# Patient Record
Sex: MALE | Race: WHITE
[De-identification: names, ages, dates, MRNs, and addresses within clinical notes are randomized per-mention and may not be internally consistent; named-entity substitution may affect disease eponyms.]

---

## 2018-12-24 ENCOUNTER — HOSPITAL ENCOUNTER (OUTPATIENT)
Dept: HOSPITAL 53 - M SMT | Age: 68
End: 2018-12-24
Attending: UROLOGY
Payer: MEDICARE

## 2018-12-24 DIAGNOSIS — Z87.898: ICD-10-CM

## 2018-12-24 DIAGNOSIS — Z79.899: ICD-10-CM

## 2018-12-24 DIAGNOSIS — N40.0: Primary | ICD-10-CM

## 2018-12-24 PROCEDURE — 84153 ASSAY OF PSA TOTAL: CPT

## 2018-12-24 PROCEDURE — 36415 COLL VENOUS BLD VENIPUNCTURE: CPT

## 2019-09-27 ENCOUNTER — HOSPITAL ENCOUNTER (OUTPATIENT)
Dept: HOSPITAL 53 - M SMT | Age: 69
End: 2019-09-27
Attending: OPHTHALMOLOGY
Payer: MEDICARE

## 2019-09-27 DIAGNOSIS — H16.223: Primary | ICD-10-CM

## 2019-09-27 LAB
FT4I SERPL CALC-MCNC: 3.6 % (ref 1.4–3.8)
T3RU NFR SERPL: 34 % (ref 33–40)
T4 FREE SERPL-MCNC: 1.08 NG/DL (ref 0.76–1.46)
T4 SERPL-MCNC: 10.7 UG/DL (ref 4.5–12)
TSH SERPL DL<=0.005 MIU/L-ACNC: 1.72 UIU/ML (ref 0.36–3.74)

## 2019-09-30 LAB — T3 SERPL-MCNC: 148.4 NG/DL (ref 60–181)

## 2020-01-22 ENCOUNTER — HOSPITAL ENCOUNTER (OUTPATIENT)
Dept: HOSPITAL 53 - M PLALAB | Age: 70
End: 2020-01-22
Attending: UROLOGY
Payer: MEDICARE

## 2020-01-22 DIAGNOSIS — Z87.898: Primary | ICD-10-CM

## 2020-01-22 PROCEDURE — 36415 COLL VENOUS BLD VENIPUNCTURE: CPT

## 2020-08-26 ENCOUNTER — HOSPITAL ENCOUNTER (OUTPATIENT)
Dept: HOSPITAL 53 - M RAD | Age: 70
End: 2020-08-26
Attending: INTERNAL MEDICINE
Payer: MEDICARE

## 2020-08-26 DIAGNOSIS — R91.1: ICD-10-CM

## 2020-08-26 DIAGNOSIS — Z12.2: Primary | ICD-10-CM

## 2020-08-26 DIAGNOSIS — F17.211: ICD-10-CM

## 2020-12-22 ENCOUNTER — HOSPITAL ENCOUNTER (OUTPATIENT)
Dept: HOSPITAL 53 - M RAD | Age: 70
End: 2020-12-22
Attending: INTERNAL MEDICINE
Payer: MEDICARE

## 2020-12-22 DIAGNOSIS — Z87.891: ICD-10-CM

## 2020-12-22 DIAGNOSIS — R91.1: ICD-10-CM

## 2020-12-22 DIAGNOSIS — Z12.2: Primary | ICD-10-CM

## 2020-12-30 ENCOUNTER — HOSPITAL ENCOUNTER (OUTPATIENT)
Dept: HOSPITAL 53 - M LAB REF | Age: 70
End: 2020-12-30
Attending: INTERNAL MEDICINE
Payer: MEDICARE

## 2020-12-30 DIAGNOSIS — E79.0: Primary | ICD-10-CM

## 2021-01-26 ENCOUNTER — HOSPITAL ENCOUNTER (OUTPATIENT)
Dept: HOSPITAL 53 - M PLALAB | Age: 71
End: 2021-01-26
Attending: UROLOGY
Payer: MEDICARE

## 2021-01-26 DIAGNOSIS — N40.1: Primary | ICD-10-CM

## 2021-01-26 DIAGNOSIS — Z87.898: ICD-10-CM

## 2021-01-26 PROCEDURE — 84153 ASSAY OF PSA TOTAL: CPT

## 2021-01-26 PROCEDURE — 36415 COLL VENOUS BLD VENIPUNCTURE: CPT

## 2021-06-25 ENCOUNTER — HOSPITAL ENCOUNTER (OUTPATIENT)
Dept: HOSPITAL 53 - M RAD | Age: 71
End: 2021-06-25
Attending: INTERNAL MEDICINE
Payer: MEDICARE

## 2021-06-25 DIAGNOSIS — R91.1: ICD-10-CM

## 2021-06-25 DIAGNOSIS — Z12.2: Primary | ICD-10-CM

## 2021-06-25 DIAGNOSIS — Z87.891: ICD-10-CM

## 2021-06-25 DIAGNOSIS — J84.10: ICD-10-CM

## 2021-08-30 ENCOUNTER — HOSPITAL ENCOUNTER (OUTPATIENT)
Dept: HOSPITAL 53 - M LABSMTC | Age: 71
End: 2021-08-30
Attending: ANESTHESIOLOGY
Payer: MEDICARE

## 2021-08-30 DIAGNOSIS — Z01.812: Primary | ICD-10-CM

## 2021-08-30 DIAGNOSIS — Z20.822: ICD-10-CM

## 2021-09-03 ENCOUNTER — HOSPITAL ENCOUNTER (OUTPATIENT)
Dept: HOSPITAL 53 - M OPP | Age: 71
Discharge: HOME | End: 2021-09-03
Attending: SURGERY
Payer: MEDICARE

## 2021-09-03 VITALS — SYSTOLIC BLOOD PRESSURE: 177 MMHG | DIASTOLIC BLOOD PRESSURE: 74 MMHG

## 2021-09-03 VITALS — WEIGHT: 163 LBS | HEIGHT: 66 IN | BODY MASS INDEX: 26.2 KG/M2

## 2021-09-03 DIAGNOSIS — R13.10: ICD-10-CM

## 2021-09-03 DIAGNOSIS — K37: ICD-10-CM

## 2021-09-03 DIAGNOSIS — K63.5: ICD-10-CM

## 2021-09-03 DIAGNOSIS — Z79.899: ICD-10-CM

## 2021-09-03 DIAGNOSIS — K31.89: ICD-10-CM

## 2021-09-03 DIAGNOSIS — Z87.891: ICD-10-CM

## 2021-09-03 DIAGNOSIS — Z12.11: Primary | ICD-10-CM

## 2021-09-03 DIAGNOSIS — K21.00: ICD-10-CM

## 2021-09-03 DIAGNOSIS — Z86.010: ICD-10-CM

## 2021-09-03 DIAGNOSIS — K57.30: ICD-10-CM

## 2021-09-03 DIAGNOSIS — K22.2: ICD-10-CM

## 2021-09-03 PROCEDURE — 88305 TISSUE EXAM BY PATHOLOGIST: CPT

## 2021-09-03 PROCEDURE — 43239 EGD BIOPSY SINGLE/MULTIPLE: CPT

## 2021-09-03 PROCEDURE — 45385 COLONOSCOPY W/LESION REMOVAL: CPT

## 2021-09-03 PROCEDURE — 43251 EGD REMOVE LESION SNARE: CPT

## 2021-10-16 ENCOUNTER — HOSPITAL ENCOUNTER (EMERGENCY)
Dept: HOSPITAL 53 - M ED | Age: 71
Discharge: HOME | End: 2021-10-16
Payer: MEDICARE

## 2021-10-16 VITALS — WEIGHT: 170.35 LBS | HEIGHT: 66.5 IN | BODY MASS INDEX: 27.05 KG/M2

## 2021-10-16 VITALS — DIASTOLIC BLOOD PRESSURE: 77 MMHG | SYSTOLIC BLOOD PRESSURE: 159 MMHG

## 2021-10-16 DIAGNOSIS — I10: Primary | ICD-10-CM

## 2021-10-16 DIAGNOSIS — M10.9: ICD-10-CM

## 2021-10-16 DIAGNOSIS — E78.5: ICD-10-CM

## 2021-10-16 LAB
ALBUMIN SERPL BCG-MCNC: 4 GM/DL (ref 3.2–5.2)
ALT SERPL W P-5'-P-CCNC: 56 U/L (ref 12–78)
BASOPHILS # BLD AUTO: 0 10^3/UL (ref 0–0.2)
BASOPHILS NFR BLD AUTO: 0.5 % (ref 0–1)
BILIRUB CONJ SERPL-MCNC: 0.1 MG/DL (ref 0–0.2)
BILIRUB SERPL-MCNC: 0.6 MG/DL (ref 0.2–1)
BUN SERPL-MCNC: 22 MG/DL (ref 7–18)
CALCIUM SERPL-MCNC: 8.9 MG/DL (ref 8.8–10.2)
CHLORIDE SERPL-SCNC: 107 MEQ/L (ref 98–107)
CK MB CFR.DF SERPL CALC: 1.43
CK MB SERPL-MCNC: 1.2 NG/ML (ref ?–3.6)
CK SERPL-CCNC: 84 U/L (ref 39–308)
CO2 SERPL-SCNC: 26 MEQ/L (ref 21–32)
CREAT SERPL-MCNC: 0.86 MG/DL (ref 0.7–1.3)
EOSINOPHIL # BLD AUTO: 0.3 10^3/UL (ref 0–0.5)
EOSINOPHIL NFR BLD AUTO: 2.9 % (ref 0–3)
GFR SERPL CREATININE-BSD FRML MDRD: > 60 ML/MIN/{1.73_M2} (ref 42–?)
GLUCOSE SERPL-MCNC: 95 MG/DL (ref 70–100)
HCT VFR BLD AUTO: 47.6 % (ref 42–52)
HGB BLD-MCNC: 15.6 G/DL (ref 13.5–17.5)
LYMPHOCYTES # BLD AUTO: 2 10^3/UL (ref 1.5–5)
LYMPHOCYTES NFR BLD AUTO: 22.3 % (ref 24–44)
MCH RBC QN AUTO: 31.4 PG (ref 27–33)
MCHC RBC AUTO-ENTMCNC: 32.8 G/DL (ref 32–36.5)
MCV RBC AUTO: 95.8 FL (ref 80–96)
MONOCYTES # BLD AUTO: 1 10^3/UL (ref 0–0.8)
MONOCYTES NFR BLD AUTO: 11.1 % (ref 2–8)
NEUTROPHILS # BLD AUTO: 5.5 10^3/UL (ref 1.5–8.5)
NEUTROPHILS NFR BLD AUTO: 62.6 % (ref 36–66)
PLATELET # BLD AUTO: 219 10^3/UL (ref 150–450)
POTASSIUM SERPL-SCNC: 5 MEQ/L (ref 3.5–5.1)
PROT SERPL-MCNC: 7.4 GM/DL (ref 6.4–8.2)
RBC # BLD AUTO: 4.97 10^6/UL (ref 4.3–6.1)
SODIUM SERPL-SCNC: 138 MEQ/L (ref 136–145)
TROPONIN I SERPL-MCNC: < 0.02 NG/ML (ref ?–0.1)
TSH SERPL DL<=0.005 MIU/L-ACNC: 1.47 UIU/ML (ref 0.36–3.74)
WBC # BLD AUTO: 8.7 10^3/UL (ref 4–10)

## 2021-10-16 NOTE — CCD
Continuity of Care Document (CCD)

                             Created on: 2021



Adal Casillas

External Reference #: [id number]

: 1950

Sex: Male



Demographics





                          Address                   [address] [place]  [address]

 

                          Home Phone                [phone]

 

                          Preferred Language        Unknown

 

                          Marital Status            Unknown

 

                          Church Affiliation     Unknown

 

                          Race                      White

 

                          Ethnic Group              Not  or 





Author





                          Author                    Adal PRICE PA

 

                          Organization              Unknown

 

                          Address                   [address]

[place]  [address]



 

                          Phone                     [phone]







Care Team Providers





                    Care Team Member Name Role                Phone

 

                    Indio Gomez MD @ [facility]                [phone]







Problems





                                        Description

 

                                        No Information Available







Social History





                Type            Date            Description     Comments

 

                Birth Sex                       Unknown          

 

                ETOH Use                        Currently consumes alcohol CASE/

WK 

 

                Tobacco Use     Start: Unknown End: Unknown Patient is a former 

smoker 1/2 ppd x55 

years quit in 2018 

 

                Recreational Drug Use                 Regularly uses Marijuana  







Allergies, Adverse Reactions, Alerts





                                        Description

 

                                        No Known Drug Allergies







Medications





           Active Medications SIG        Qnty       Indications Ordering Provide

r Date

 

                          Omeprazole                     40mg Capsules DR       

            1 by mouth 

daily           30caps          K21.00          Cade Pride M.D. 2021

 

                          Allopurinol                     100mg Tablets         

          2 by mouth every

day                                             Unknown         

 

           Finasteride                     5mg Tablets                   daily  

                          Unknown    



 

                          Aspirin Low Dose                     81mg Tablets DR  

                 1 PO 

Daily                                           Unknown         

 

                          Vitamin B-12                     1000mcg Tablets      

             1 by mouth 

every day                                       Unknown         

 

                          Atorvastatin Calcium                     20mg Tablets 

                  one tab 

once daily                                      Unknown         

 

                                        History Medications

 

                                        Suprep Bowel Prep Kit                   

  17.5-3.13-1.6GM/177ML Solution        

                take per doctor's bowel prep instructions. 354ml                

           Cade Pride M.D.                                    2021 - 2021







Immunizations





                                        Description

 

                                        No Information Available







Vital Signs





                Date            Vital           Result          Comment

 

                2021  1:30pm BP Systolic     148 mmHg         

 

                    BP Diastolic        76 mmHg              

 

                    Heart Rate          83 /min              

 

                    Body Temperature    98.3 F             

 

                    Height              66.5 inches         5'6.50"

 

                    Weight              172.00 lb            

 

                    BMI (Body Mass Index) 27.3 kg/m2           

 

                    Ideal Body Weight   142 lb               

 

                    Weight              78.019 kg            

 

                    BSA (Body Surface Area) 1.89 m2              

 

                2021 10:55am BP Systolic     135 mmHg         

 

                    BP Diastolic        74 mmHg              

 

                    Body Temperature    98.4 F             

 

                    Height              66.5 inches         5'6.50"

 

                    Weight              171.38 lb            

 

                    BMI (Body Mass Index) 27.2 kg/m2           

 

                    Ideal Body Weight   142 lb               

 

                    Weight              77.736 kg            

 

                    BSA (Body Surface Area) 1.88 m2              







Results





        Test    Acquired Date Facility Test    Result  H/L     Range   Note

 

                    Laboratory test finding 2021          [facility]

                                        [address] [place], [place] [id number] [phone] Pathology Request For Service (SEE NOTE)              

          1







                          1                         FINAL DIAGNOSIS



A-Gastric polyp, polypectomy:

Polypoid fragments of gastric mucosa with focal foveolar hyperplasia,

mild chronic inflammation and reactive changes.

No H.pylori is identified.



B-Colon polyps, polypectomy:

Tubular adenoma, fragments.

Separate fragments of hyperplastic polyp.

                                        2021 - 



CLINICAL DIAGNOSIS



H/O polyps, dysphagia

                                        2021 - 1450



GROSS DIAGNOSIS



A - Received in formalin labeled "gastric polyp" and consists of

fragment of tissue 0.3 x 0.2 x 0.1 cm.  All in one.



B - Received in formalin labeled "colon polyps" and consists of

fragments of tissue 0.5 x 0.5 x 0.2 cm. in aggregate.  All in one.

                                        -OA

                                        2021 - 



Signed________ RIKKI AMBRIZ MD 2021 1053









Procedures





                Date            Code            Description     Status

 

                2021      54151           Office/Outpatient Established Mo

d MDM 30-39 Min Completed

 

                2021      77998           Colonoscopy W/ Poly Completed

 

                2021      64458           Endoscopy Upper GI Remove Tumor/

Polyp/Lesion Snare Technique 

Completed

 

                2021      34690           Endoscopy Upper GI Balloon Dilat

ion Of Esophagus Completed

 

                2021      96086           Office/Outpatient New Moderate M

DM 45-59 Minutes Completed







[employer] Devices





                                        Description

 

                                        No Information Available







Encounters





           Type       Date       Location   Provider   Dx         Diagnosis

 

             Office Visit 2021  1:30p [facility] KENIA Cabello 

D12.6                                   Benign neoplasm of colon, unspecified

 

                          K21.00                    Gastro-esophageal reflux dis

 with esophagitis, without bleed

 

                          K57.30                    Dvrtclos of lg int w/o perfo

ration or abscess w/o bleeding

 

             Office Visit 2021 10:30a [facility] KENIA Cabello 

Z86.010                                 Personal history of colonic polyps

 

                          R13.10                    Dysphagia, unspecified

 

                          K57.30                    Dvrtclos of lg int w/o perfo

ration or abscess w/o bleeding







Assessments





                Date            Code            Description     Provider

 

                2021      D12.6           Benign neoplasm of colon, unspec

ified KENIA Lancaster

 

                    2021          K21.00              Gastro-esophageal re

flux disease with esophagitis, without 

bleeding                                KENIA Lancaster

 

                    2021          K57.30              Diverticulosis of la

rge intestine without perforation or 

abscess without bleeding                KENIA Lancaster

 

                2021      Z12.11          Encounter for screening for vikash

gnant neoplasm of colon Cade Pride M.D.

 

                2021      Z86.010         Personal history of colonic poly

ps Cade Pride M.D.

 

                2021      D12.6           Benign neoplasm of colon, unspec

ified Cade Pride M.D.

 

                2021      K31.7           Polyp of stomach and duodenum Ro

ceci Pride M.D.

 

                    2021          K57.30              Diverticulosis of la

rge intestine without perforation or 

abscess without bleeding                Cade Pride M.D.

 

                2021      R13.10          Dysphagia, unspecified Cade barney M.D.

 

                2021      K22.2           Esophageal obstruction Cade barney M.D.

 

                2021      K31.89          Other diseases of stomach and du

odenum Cade Pride M.D.

 

                    2021          K21.00              Gastro-esophageal re

flux disease with esophagitis, without 

bleeding                                Cade Pride M.D.

 

                2021      Z86.010         Personal history of colonic poly

ps KENIA Lancaster

 

                2021      R13.10          Dysphagia, unspecified KENIA aCbello

 

                    2021          K57.30              Diverticulosis of la

rge intestine without perforation or 

abscess without bleeding                KENIA Lancaster







Plan of Treatment

2021 - KENIA Lancaster* D12.6 Benign neoplasm of colon, unspecified

* K21.00 Gastro-esophageal reflux disease with esophagitis, without bleeding* 
  New Medication:* Omeprazole 40 mg - 1 by mouth daily





* K57.30 Diverticulosis of large intestine without perforation or abscess 
  without bleeding





Functional Status





                                        Description

 

                                        No Information Available







Mental Status





                                        Description

 

                                        No Information Available







Referrals





                                        Description

 

                                        No Information Available

## 2021-10-16 NOTE — CCD
Continuity of Care Document (CCD)

                             Created on: 2021



Adal Casillas

External Reference #: [id number]

: 1950

Sex: Male



Demographics





                          Address                   [address] [place]  [address]

 

                          Home Phone                [phone]

 

                          Preferred Language        Unknown

 

                          Marital Status            Unknown

 

                          [employer]ian Affiliation     Unknown

 

                          Race                      White

 

                          Ethnic Group              Not  or 





Author





                          Author                    Adal PRICE PA

 

                          Organization              Unknown

 

                          Address                   [address]

[place]  [address]



 

                          Phone                     [phone]







Care Team Providers





                    Care Team Member Name Role                Phone

 

                    Indio Gomez MD @ [facility]                [phone]







Problems





                                        Description

 

                                        No Information Available







Social History





                Type            Date            Description     Comments

 

                Birth Sex                       Unknown          

 

                ETOH Use                        Currently consumes alcohol CASE/

WK 

 

                Tobacco Use     Start: Unknown End: Unknown Patient is a former 

smoker 1/2 ppd x55 

years quit in 2018 

 

                Recreational Drug Use                 Regularly uses Marijuana  







Allergies, Adverse Reactions, Alerts





                                        Description

 

                                        No Known Drug Allergies







Medications





           Active Medications SIG        Qnty       Indications Ordering Provide

r Date

 

                          Omeprazole                     40mg Capsules DR       

            1 by mouth 

daily           30caps          K21.00          Cade Pride M.D. 2021

 

                          Allopurinol                     100mg Tablets         

          2 by mouth every

day                                             Unknown         

 

           Finasteride                     5mg Tablets                   daily  

                          Unknown    



 

                          Aspirin Low Dose                     81mg Tablets DR  

                 1 PO 

Daily                                           Unknown         

 

                          Vitamin B-12                     1000mcg Tablets      

             1 by mouth 

every day                                       Unknown         

 

                          Atorvastatin Calcium                     20mg Tablets 

                  one tab 

once daily                                      Unknown         

 

                                        History Medications

 

                                        Suprep Bowel Prep Kit                   

  17.5-3.13-1.6GM/177ML Solution        

                take per doctor's bowel prep instructions. 354ml                

           Cade Pride M.D.                                    2021 - 2021







Immunizations





                                        Description

 

                                        No Information Available







Vital Signs





                Date            Vital           Result          Comment

 

                2021  1:30pm BP Systolic     148 mmHg         

 

                    BP Diastolic        76 mmHg              

 

                    Heart Rate          83 /min              

 

                    Body Temperature    98.3 F             

 

                    Height              66.5 inches         5'6.50"

 

                    Weight              172.00 lb            

 

                    BMI (Body Mass Index) 27.3 kg/m2           

 

                    Ideal Body Weight   142 lb               

 

                    Weight              78.019 kg            

 

                    BSA (Body Surface Area) 1.89 m2              

 

                2021 10:55am BP Systolic     135 mmHg         

 

                    BP Diastolic        74 mmHg              

 

                    Body Temperature    98.4 F             

 

                    Height              66.5 inches         5'6.50"

 

                    Weight              171.38 lb            

 

                    BMI (Body Mass Index) 27.2 kg/m2           

 

                    Ideal Body Weight   142 lb               

 

                    Weight              77.736 kg            

 

                    BSA (Body Surface Area) 1.88 m2              







Results





        Test    Acquired Date Facility Test    Result  H/L     Range   Note

 

                    Laboratory test finding 2021          [facility] Lab

                                        [address] [place] [address]

           [phone] Pathology Request For Service (SEE NOTE)              

          1







                          1                         FINAL DIAGNOSIS



A-Gastric polyp, polypectomy:

Polypoid fragments of gastric mucosa with focal foveolar hyperplasia,

mild chronic inflammation and reactive changes.

No H.pylori is identified.



B-Colon polyps, polypectomy:

Tubular adenoma, fragments.

Separate fragments of hyperplastic polyp.

                                        2021 - 



CLINICAL DIAGNOSIS



H/O polyps, dysphagia

                                        2021 - 1450



GROSS DIAGNOSIS



A - Received in formalin labeled "gastric polyp" and consists of

fragment of tissue 0.3 x 0.2 x 0.1 cm.  All in one.



B - Received in formalin labeled "colon polyps" and consists of

fragments of tissue 0.5 x 0.5 x 0.2 cm. in aggregate.  All in one.

                                        -OA

                                        2021 - 



Signed________ RIKKI AMBRIZ MD 2021 1053









Procedures





                Date            Code            Description     Status

 

                2021      63974           Colonoscopy W/ Poly Completed

 

                2021      55908           Endoscopy Upper GI Remove Tumor/

Polyp/Lesion Snare Technique 

Completed

 

                2021      53053           Endoscopy Upper GI Balloon Dilat

ion Of Esophagus Completed

 

                2021      12189           Office/Outpatient New Moderate M

DM 45-59 Minutes Completed







[employer]





                                        Description

 

                                        No Information Available







Encounters





           Type       Date       Location   Provider   Dx         Diagnosis

 

             Office Visit 2021 10:30a [facility] KENIA Cabello 

Z86.010                                 Personal history of colonic polyps

 

                          R13.10                    Dysphagia, unspecified

 

                          K57.30                    Dvrtclos of lg int w/o perfo

ration or abscess w/o bleeding







Assessments





                Date            Code            Description     Provider

 

                2021      D12.6           Benign neoplasm of colon, unspec

ified KENIA Lancaster

 

                    2021          K21.00              Gastro-esophageal re

flux disease with esophagitis, without 

bleeding                                KENIA Lancaster

 

                    2021          K57.30              Diverticulosis of la

rge intestine without perforation or 

abscess without bleeding                KENIA Lancaster

 

                2021      Z12.11          Encounter for screening for vikash

gnant neoplasm of colon Cade Pride M.D.

 

                2021      Z86.010         Personal history of colonic poly

ps Cade Pride M.D.

 

                2021      D12.6           Benign neoplasm of colon, unspec

ified Cade Pride M.D.

 

                2021      K31.7           Polyp of stomach and duodenum Ro

ceci Pride M.D.

 

                    2021          K57.30              Diverticulosis of la

rge intestine without perforation or 

abscess without bleeding                Cade Pride M.D.

 

                2021      R13.10          Dysphagia, unspecified Cade barney M.D.

 

                2021      K22.2           Esophageal obstruction Cade barney M.D.

 

                2021      K31.89          Other diseases of stomach and du

odenum Cade Pride M.D.

 

                    2021          K21.00              Gastro-esophageal re

flux disease with esophagitis, without 

bleeding                                Cade Pride M.D.

 

                2021      Z86.010         Personal history of colonic poly

ps KENIA Lancaster

 

                2021      R13.10          Dysphagia, unspecified KENIA Cabello

 

                    2021          K57.30              Diverticulosis of la

rge intestine without perforation or 

abscess without bleeding                KENIA Lancaster







Plan of Treatment

2021 - KENIA Lancaster* D12.6 Benign neoplasm of colon, unspecified

* K21.00 Gastro-esophageal reflux disease with esophagitis, without bleeding* 
  New Medication:* Omeprazole 40 mg - 1 by mouth daily





* K57.30 Diverticulosis of large intestine without perforation or abscess 
  without bleeding





Functional Status





                                        Description

 

                                        No Information Available







Mental Status





                                        Description

 

                                        No Information Available







Referrals





                                        Description

 

                                        No Information Available

## 2021-10-16 NOTE — CCD
Continuity of Care Document (CCD)

                             Created on: 2021



Adal Casillas

External Reference #: [id number]

: 1950

Sex: Male



Demographics





                          Address                   [address] [place]  [address]

 

                          Home Phone                [phone]

 

                          Preferred Language        Unknown

 

                          Marital Status            Unknown

 

                          Mormon Affiliation     Unknown

 

                          Race                      White

 

                          Ethnic Group              Declined to Specify/Unknown





Author





                          Author                    Adal MOSES [place]

 DO

 

                          Organization              Unknown

 

                          Address                   [address] [facility] [address]

[place]  [address]



 

                          Phone                     [phone]







Care Team Providers





                    Care Team Member Name Role                Phone

 

                    Indio Moses JR, MD AUTM                Unavailable

 

                    Adonay Bermudez M.D. AUTM                [phone]







Problems





                    Active Problems     Provider            Date

 

                    Benign prostatic hyperplasia without outflow obstruction RASTA King 

Onset: 07/10/2012

 

                    Chronic obstructive lung disease RASTA Faith Onse

t: 07/10/2012

 

                    Gout                RASTA Faith Onset: 07/10/2012

 

                    Alcohol abuse       RASTA Faith Onset: 07/10/2012

 

                    Tobacco user        RASTA Faith Onset: 07/10/2012

 

                    Old myocardial infarction Edson Aguiar, FNP   Onset: 

016

 

                    Raised prostate specific antigen Indio Moses MD Onset

: 2019







Social History





                Type            Date            Description     Comments

 

                Birth Sex                       Unknown          

 

                ETOH Use                        Currently consumes alcohol He dr

inks about a case of beer a week 

 

                Tobacco Use     Start: Unknown End: Unknown Patient is a former 

smoker He quit 

smoking in the summer of . He smoked a 1/2 a pack plus of cigarettes for 
about 60 years 







Allergies, Adverse Reactions, Alerts





                                        Description

 

                                        No Known Drug Allergies







Medications





           Active Medications SIG        Qnty       Indications Ordering Provide

r Date

 

                          Atorvastatin Calcium                     20mg Tablets 

                  Take One

Tablet By Mouth Every Day 90tabs                          Indio Moses MD 

10/07/2016

 

                          Allopurinol                     100mg Tablets         

          Take Two Tablets

By Mouth Every Day 180tabs                         Indio Moses MD 06/15/2

010

 

                          Finasteride                     5mg Tablets           

        1 by mouth every 

day             30tabs                          Indio Moses MD 

 

                          Aspirin 81                     81mg Tablets DR Awan by mouth 

every day                                       Unknown         







Medications Administered in Office





           Medication SIG        Qnty       Indications Ordering Provider Date

 

                          Covid-19 vaccine, Unspecified                      Inj

ection                    

                                                Unknown         2021

 

                          Covid-19 vaccine, Unspecified                      Inj

ection                    

                                                Unknown         2021

 

                                        Immunization Adminstration,1 Vaccine/Tox

oid                      Injection      

                                                    Indio Moses MD 2020







Immunizations





             CPT Code     Status       Date         Vaccine      Lot #

 

             U-Flu        Given        2020   Influenza,Unspecified  

 

             38261        Given        2020   Adacel- Tetanus Diphtheria P

ertussis (Age65 & Under) 

[id number]

 

                69001           Given           10/30/2018      Shingrix Zoster 

Vaccine (HZV), Recombinant, Subunit, 

Adjuvanted                               

 

             U-Flu        Given        10/18/2018   Influenza,Unspecified  

 

             40315        Given        2018   Shingrix      

 

             U-Pneum      Given        09/15/2017   Pneumococcal,Unspecified  

 

             U-PneuC      Given        09/15/2016   Prevnar 13    

 

             18091        Given        2013   Zostavax      

 

             65631        Given        06/15/2010   Pneumovax 23  







Vital Signs





                Date            Vital           Result          Comment

 

                2021  9:25am BP Systolic     136 mmHg         

 

                    BP Diastolic        80 mmHg              

 

                    Height              66.50 inches        5'6.50"

 

                    Weight              169.12 lb            

 

                    BMI (Body Mass Index) 26.9 kg/m2           

 

                2020  9:05am BP Systolic     136 mmHg         

 

                    BP Diastolic        72 mmHg              

 

                    Heart Rate          78 /min              

 

                    Height              66.50 inches        5'6.50"

 

                    Weight              168.00 lb            

 

                    O2 % BldC Oximetry  98 %                 

 

                    BMI (Body Mass Index) 26.7 kg/m2           







Results





        Test    Acquired Date Facility Test    Result  H/L     Range   Note

 

                    Coronavirus 2019 Nasopharygeal 2021          [facility]

                                        [address] [place], [place] [id number] [phone] Coronavirus 2019 Nasopharygeal ASSAY INFORMATIO <SEE N

OTE>                        1

 

                    Complete Blood Count 2021          [facility]

s, pc

: Dr Indio Moses

[place] [address]

           [phone] WBC        6.8 x10*3/UL            4.1 - 10.9  

 

             RBC          5.02 x10*6/UL              4.20 - 6.30   

 

             Hemoglobin   15.5 g/dL                 12.0 - 18.0   

 

             Hematocrit   46.5 %                    37.0 - 51.0   

 

             MCV          92.6 fL                   80.0 - 97.0   

 

             MCH          30.9 pg                   26.0 - 32.0   

 

             MCHC         33.4 g/dL                 31.0 - 38.0   

 

             RDW          13.7 %                    11.6 - 13.7   

 

             PLT          238 x10*3/UL              140 - 440     

 

             MPV          7.7 FL       Low          7.8 - 11.0    

 

             Lymph %      30.1 %                    10.0 - 58.5   

 

             Mid %        6.9 %                     1.7 - 9.3     

 

             Neut %       63.0 %                    37.0 - 92.0   

 

             Lymph #      2.0 x10*3/UL              0.6 - 4.1     

 

             Mid #        0.5 x10*3/UL              0.1 - 0.6     

 

             Neut #       4.3 x10*3/UL              2.0 - 7.8     

 

                    Comprehensive Chem Profile 2021          [facility]

[facility], 

: Dr Indio Moses

[place] [address] [phone] Glucose    103 mg/dL  High       74 - 99    2

 

             BUN          21 mg/dL     High         7 - 18        

 

             Creatinine   0.8 mg/dL                 0.6 - 1.3     

 

             Sodium       138 mEq/L                 136 - 145     

 

             Potassium    4.5 mEq/L                 3.5 - 5.1     

 

             Chloride     104 mEq/L                 98 - 107      

 

             Carbon Dioxide 22 mEq/L                  21 - 32       

 

             Calcium      9.5 mg/dL                 8.5 - 10.1    

 

             Alk. Phosphatase 70 mg/dL                  46 - 116      

 

             Total Bilirubin 0.6 mg/dL                 0.2 - 1.0     

 

             Ast (Sgot)   25 U/L                    15 - 37       

 

             Alt (SGPT)   58 U/L                    12 - 78       

 

             Albumin      4.2 g/dL                  3.4 - 5.0     

 

             Total Protein 7.1 g/dL                  6.4 - 8.2     

 

             A/G Ratio    1.45 CALC                 1.00 - 1.90   

 

             GFR  >= 60 mL/min              >60           

 

             GFR African American >= 60 mL/min              >60          3

 

                    Lipid Profile       2021          [facility]

, 

: Dr Indio Moses

[place] [address] [phone] Cholesterol 160 mg/dL             131 - 200   

 

             Triglycerides 158 mg/dL    High         30 - 150      

 

             HDL Cholesterol 57 mg/dL                  35 - 60       

 

             LDL (Calculated) 71 CALC                   50 - 159      

 

                    Laboratory test finding 2021          [facility]

: Dr Indio Moses

[place] [address] [phone] PSA        1.55 ng/mL            <4.00      4







                          1                         ASSAY INFORMATION: Real Time

 RT-PCR

NOTE: The COVID-19 assay has been cleared by the U.S. Food and Drug

Administration under the Emergency Use Authorization (EUA). [employer] [facility] and [facility] are designated as high complexity laboratories by the

Clinical Laboratory Improvement Amendments of 1988(CLIA) and are qualified to

perform this test.



Not Detected





 

                          2                         100-125 mg/dL     PRE-DIABET

ES/FASTING

>126 mg/dL          DIABETES/FASTING



 

                          3                         CHRONIC KIDNEY DISEASE STAGI

NG PER NKF



STAGE I & II      GFR >= 60        NORMAL TO MILDLY DECREASED

STAGE III          GFR 30-59          MODERATELY DECREASED

STAGE IV           GFR 15-29         SEVERELY DECREASED

STAGE V            GFR <15            VERY LITTLE GFR LEFT

ESRD                 GFR <15            ON RRT



 

                          4                         This assay was performed on 

the [employer] EXL using the B-

Galactosidase/CPRG methodology and should not be compared interchangeably with 
other methods.  The PSA should not be used alone as a screening test for the 
presence or absence of malignant disease.









Procedures





                Date            Code            Description     Status

 

                    2021          97986               Chronic Care MGMT 20

 Mins Clinical Staff Time Per Calendar 

Month                                   Completed

 

                2021      32048           Office/Outpatient Established Mo

d MDM 30-39 Min Completed

 

                    2021          38245               Chronic Care MGMT 20

 Mins Clinical Staff Time Per Calendar 

Month                                   Completed

 

                2021      99617           Chronic Care Management Services

 Ea Addl 20 Min Completed

 

                    2021          02739               Chronic Care MGMT 20

 Mins Clinical Staff Time Per Calendar 

Month                                   Completed

 

                2021      28000           Chronic Care Management Services

 Ea Addl 20 Min Completed

 

                10/31/2019      [id number]       Diabetic Retinal Eye Exam Comple

justa

 

                10/25/2016      [id number]        Colonoscopy     Completed

 

                2016      [id number]        Colonoscopy     Completed

 

                2011      [id number]        Colonoscopy     Completed

 

                2008      31604445        Colonoscopy     Completed

 

                10/17/2006      [id number]        Colonoscopy     Completed







[employer]





                                        Description

 

                                        No Information Available







Encounters





           Type       Date       Location   Provider   Dx         Diagnosis

 

             Office Visit 2021  9:40a [facility] Indio Moses MD 

N40.0                                   Benign prostatic hyperplasia without low

er urinry tract symp

 

                          R97.20                    Elevated prostate specific a

ntigen [PSA]

 

                          E79.0                     Hyperuricemia w/o signs of i

nflam arthrit and tophaceous dis

 

                          R91.1                     Solitary pulmonary nodule

 

                          F17.211                   Nicotine dependence, cigaret

kimber, in remission

 

                          R73.09                    Other abnormal glucose

 

                          Z86.010                   Personal history of colonic 

polyps

 

                          E78.00                    Pure hypercholesterolemia, u

nspecified

 

                          Z13.89                    Encounter for screening for 

other disorder







Assessments





                Date            Code            Description     Provider

 

                    2021          N40.0               Benign prostatic hyp

erplasia without lower urinary tract 

symptoms                                Indio Moses MD

 

                2021      E78.00          Pure hypercholesterolemia, unspe

cified Indio Moses MD

 

                2021      Z86.010         Personal history of colonic poly

ps Indio Moses MD

 

                    2021          N40.0               Benign prostatic hyp

erplasia without lower urinary tract 

symptoms                                Indio Moses MD

 

                2021      R97.20          Elevated prostate specific antig

en [PSA] Indio Moses MD

 

                    2021          E79.0               Hyperuricemia withou

t signs of inflammatory arthritis and 

tophaceous disease                      Indio Moses MD

 

                2021      R91.1           Solitary pulmonary nodule Selvin

jah Moses MD

 

                2021      F17.211         Nicotine dependence, cigarettes,

 in remission Indio Moses MD

 

                2021      R73.09          Other abnormal glucose Indio Moses MD

 

                2021      Z86.010         Personal history of colonic poly

ps Indio Moses MD

 

                2021      E78.00          Pure hypercholesterolemia, unspe

cified Indio Moses MD

 

                2021      Z13.89          Encounter for screening for othe

r disorder Indio Moses MD

 

                    2021          N40.0               Benign prostatic hyp

erplasia without lower urinary tract 

symptoms                                Indio Moses MD

 

                2021      E78.00          Pure hypercholesterolemia, unspe

cified Indio Moses MD

 

                2021      J44.9           Chronic obstructive pulmonary di

sease, unspecified Indio Moses MD

 

                    2021          N40.0               Benign prostatic hyp

erplasia without lower urinary tract 

symptoms                                Indio Moses MD

 

                2021      E78.00          Pure hypercholesterolemia, unspe

cified Indio Moses MD

 

                2021      F17.211         Nicotine dependence, cigarettes,

 in remission Indio Moses MD

 

                2021      J44.9           Chronic obstructive pulmonary di

sease, unspecified Indio Moses MD







Plan of Treatment

Future Appointment(s):* 2022  9:00 am - [facility] - Indio Moses MD* E79.0 Hyperuricemia without signs of 
  inflammatory arthritis and tophaceous disease

* N40.0 Benign prostatic hyperplasia without lower urinary tract sym

* F17.211 Nicotine dependence, cigarettes, in remission* Comments:* Smoking 
  cessation discussed.





* E78.00 Pure hypercholesterolemia, unspecified

* R97.20 Elevated prostate specific antigen [PSA]

* Z86.010 Personal history of colonic polyps

* Z13.89 Encounter for screening for other disorder

* Z23 Encounter for immunization





Functional Status





                                        Description

 

                                        No Information Available







Mental Status





                                        Description

 

                                        No Information Available







Referrals





                Refer to      Reason for Referral Status          Appt Date

 

                Cade Pride MD CONSULT FOR SCREENING COLONOSCOPY  Patient 

Notified 

2021

 

                                        [facility]

 

                                        [address] [facility] [address]

 

                                        [place] [address]

 

                                        [phone]

 

                                          

 

                [facility] Chest CT Screening Program CT EARLY CANCER LUNG SCREENING EX

AM  Created         



 

                                        [address] [place]  [address]

 

                                        [phone]

## 2021-10-16 NOTE — CCD
Summarization Of Episode

                             Created on: 10/16/2021



LINDA PEREIRA

External Reference #: [id number]

: 1950

Sex: Undifferentiated



Demographics





                          Address                   [address] [place] [address]

 

                          Home Phone                [phone]

 

                          Preferred Language        English

 

                          Marital Status            Unknown

 

                          Restorationism Affiliation     Unknown

 

                          Race                      Unknown

 

                          Ethnic Group              Not  or 





Author





                          Author                    [facility]

 

                          Organization              [facility]

 

                          Address                   Unknown

 

                          Phone                     Unavailable







Support





                Name            Relationship    Address         Phone

 

                RE              Next Of Kin     Unknown         Unavailable

 

                    LAWYER JASMINA    Next Of Kin         [address] [place] [address]                    [phone]

 

                    JASMINA PEREIRA    Next Of Kin         [address] [place] [address]                    [phone]

 

                    JASMINA PEREIRA    ECON                [address] [place] [address]                    Unavailable







Care Team Providers





                    Care Team Member Name Role                Phone

 

                    HAILY Gomez MD Unavailable         Unavailable

 

                    HAILY Gomez MD Unavailable         Unavailable

 

                    HAILY Gomez MD Unavailable         Unavailable

 

                    HAILY Gomez MD Unavailable         Unavailable

 

                    HAILY Gomez MD Unavailable         Unavailable

 

                    HAILY Gomez MD Unavailable         Unavailable

 

                    HAILY Gomez MD Unavailable         Unavailable

 

                    HAILY Gomez MD Unavailable         Unavailable

 

                    HAILY Gomez MD Unavailable         Unavailable

 

                    HAILY Gomez MD Unavailable         Unavailable

 

                    HAILY Gomez MD Unavailable         Unavailable

 

                    HAILY Gomez MD Unavailable         Unavailable

 

                    HAILY Gomez MD Unavailable         Unavailable

 

                    HAILY Gomez MD Unavailable         Unavailable

 

                    HAILY Gomez MD Unavailable         Unavailable

 

                    HAILY Gomez MD Unavailable         Unavailable

 

                    HAILY Gomez MD Unavailable         Unavailable

 

                    HAILY Gomez MD Unavailable         Unavailable

 

                    HAILY Gomez MD Unavailable         Unavailable

 

                    HAILY Gomez MD Unavailable         Unavailable

 

                    HAILY Gomez MD Unavailable         Unavailable

 

                    HAILY Gomez MD Unavailable         Unavailable

 

                    HAILY Gomez MD Unavailable         Unavailable

 

                    HAILY Gomez MD Unavailable         Unavailable

 

                    HAILY Gomez MD Unavailable         Unavailable

 

                    HAILY Gomez MD Unavailable         Unavailable

 

                    HAILY Gomez MD Unavailable         Unavailable

 

                    HAILY Gomez MD Unavailable         Unavailable

 

                    HAILY Gomez MD Unavailable         Unavailable

 

                    HAILY Gomez MD Unavailable         Unavailable

 

                    HAILY Gomez MD Unavailable         Unavailable

 

                    HAILY Gomez MD Unavailable         Unavailable

 

                    HAILY Gomez MD Unavailable         Unavailable

 

                    HAILY Gomez MD Unavailable         Unavailable

 

                    HAILY Gomez MD Unavailable         Unavailable

 

                    JasonHAILY MD Unavailable         Unavailable

 

                    ColfaxHAILY MD Unavailable         Unavailable

 

                    JasonHAILY MD Unavailable         Unavailable

 

                    ColfaxHAILY MD Unavailable         Unavailable

 

                    ColfaxHAILY MD Unavailable         Unavailable

 

                    ColfaxHAILY MD Unavailable         Unavailable

 

                    ColfaxHAILY MD Unavailable         Unavailable

 

                    JasonHAILY MD Unavailable         Unavailable

 

                    JasonHAILY MD Unavailable         Unavailable

 

                    JasonHAILY MD Unavailable         Unavailable

 

                    ColfaxHAILY MD Unavailable         Unavailable

 

                    JasonHAILY MD Unavailable         Unavailable

 

                    ColfaxHAILY MD Unavailable         Unavailable

 

                    ColfaxHAILY MD Unavailable         Unavailable

 

                    ColfaxHAILY MD Unavailable         Unavailable

 

                    ColfaxHAILY MD Unavailable         Unavailable

 

                    ColfaxHAILY MD Unavailable         Unavailable

 

                    ColfaxHAILY MD Unavailable         Unavailable

 

                    ColfaxHAILY MD Unavailable         Unavailable

 

                    JasonHAILY MD Unavailable         Unavailable

 

                    ColfaxHAILY MD Unavailable         Unavailable

 

                    JsaonHAILY MD Unavailable         Unavailable

 

                    JasonHAILY MD Unavailable         Unavailable

 

                    JasonHAILY MD Unavailable         Unavailable

 

                    ColfaxHAILY MD Unavailable         Unavailable

 

                    JasonHAILY MD Unavailable         Unavailable

 

                    JasonHAILY MD Unavailable         Unavailable

 

                    ColfaxHAILY MD Unavailable         Unavailable

 

                    ColfaxHAILY MD Unavailable         Unavailable

 

                    ColfaxHAILY MD Unavailable         Unavailable

 

                    ColfaxHAILY MD Unavailable         Unavailable

 

                    ColfaxHAILY MD Unavailable         Unavailable

 

                    JasonHAILY MD Unavailable         Unavailable

 

                    ColfaxHAILY MD Unavailable         Unavailable

 

                    JasonHAILY paredes MD Unavailable         Unavailable

 

                    JasonHAILY paredes MD Unavailable         Unavailable

 

                    JasonHAILY MD Unavailable         Unavailable

 

                    ColfaxHAILY MD Unavailable         Unavailable

 

                    ColfaxHAILY paredes MD Unavailable         Unavailable

 

                    ColfaxHAILY MD Unavailable         Unavailable

 

                    JasonHAILY paredes MD Unavailable         Unavailable

 

                    JasonHAILY paredes MD Unavailable         Unavailable

 

                    JasonHAILY paredes MD Unavailable         Unavailable

 

                    JasonHAILY MD Unavailable         Unavailable

 

                    JasonHAILY MD Unavailable         Unavailable

 

                    JasonHAILY MD Unavailable         Unavailable

 

                    JasonHAILY MD Unavailable         Unavailable

 

                    JasonHAILY MD Unavailable         Unavailable

 

                    JasonHAILY MD Unavailable         Unavailable

 

                    JasonHAILY MD Unavailable         Unavailable

 

                    ColfaxHAILY MD Unavailable         Unavailable

 

                    Amaya, L Dunia RPA Unavailable         Unavailable

 

                    Amaya, L Dunia RPA Unavailable         Unavailable

 

                    Amaya, L Dunia RPA Unavailable         Unavailable

 

                    Amaya, L Dunia RPA Unavailable         Unavailable

 

                    Amaya, L Dunia RPA Unavailable         Unavailable

 

                    Amaya, L Dunia RPA Unavailable         Unavailable

 

                    Amaya, L Dunia RPA Unavailable         Unavailable

 

                    Amaya, L Dunia RPA Unavailable         Unavailable

 

                    Amaya, L Dunia RPA Unavailable         Unavailable

 

                    Amaya, L Dunia RPA Unavailable         Unavailable

 

                    Amaya, L Dunia RPA Unavailable         Unavailable

 

                    Amaya, L Dunia RPA Unavailable         Unavailable

 

                    Amaya, L Dunia RPA Unavailable         Unavailable

 

                    Amaya, L Dunia RPA Unavailable         Unavailable

 

                    Amaya, L Dunia RPA Unavailable         Unavailable

 

                    Amaya, L Dunia RPA Unavailable         Unavailable

 

                    Amaya, L Dunia RPA Unavailable         Unavailable

 

                    Amaya, L Dunia RPA Unavailable         Unavailable

 

                    Amaya, L Dunia RPA Unavailable         Unavailable

 

                    Amaya, L Dunia RPA Unavailable         Unavailable

 

                    Amyaa, L Dunia RPA Unavailable         Unavailable

 

                    Amaya, L Dunia RPA Unavailable         Unavailable

 

                    Amaya, L Dunia RPA Unavailable         Unavailable

 

                    Amaya, L Dunia RPA Unavailable         Unavailable

 

                    Amaya, L Dunia RPA Unavailable         Unavailable

 

                    Amaya, L Dunia RPA Unavailable         Unavailable

 

                    Amaya, L Dunia RPA Unavailable         Unavailable

 

                    Amaya, L Dunia RPA Unavailable         Unavailable

 

                    Amaya, L Dunia RPA Unavailable         Unavailable

 

                    Amaya, L Dunia RPA Unavailable         Unavailable

 

                    Amaya, L Dunia RPA Unavailable         Unavailable

 

                    Amaya, L Dunia RPA Unavailable         Unavailable



                                  



Re-disclosure Warning

          The records that you are about to access may contain information from 
federally-assisted alcohol or drug abuse programs. If such information is 
present, then the following federally mandated warning applies: This information
has been disclosed to you from records protected by federal confidentiality 
rules (42 CFR part 2). The federal rules prohibit you from making any further 
disclosure of this information unless further disclosure is expressly permitted 
by the written consent of the person to whom it pertains or as otherwise 
permitted by 42 CFR part 2. A general authorization for the release of medical 
or other information is NOT sufficient for this purpose. The Federal rules 
restrict any use of the information to criminally investigate or prosecute any 
alcohol or drug abuse patient.The records that you are about to access may 
contain highly sensitive health information, the redisclosure of which is 
protected by Article 27-F of the [facility] Public Health law. If you 
continue you may have access to information: Regarding HIV / AIDS; Provided by 
facilities licensed or operated by the [facility] Office of Mental Health; 
or Provided by the [facility] Office for People With Developmental 
Disabilities. If such information is present, then the following [place] 
mandated warning applies: This information has been disclosed to you from 
confidential records which are protected by state law. State law prohibits you 
from making any further disclosure of this information without the specific 
written consent of the person to whom it pertains, or as otherwise permitted by 
law. Any unauthorized further disclosure in violation of state law may result in
a fine or penitentiary sentence or both. A general authorization for the release of 
medical or other information is NOT sufficient authorization for further disc
losure.                                                                         
    



Encounters

          



           Encounter  Providers  Location   Date       Indications Data Source(s

)

 

                Outpatient      Attender: Dunia Amaya RPA Dano/Copeland/Darci/R

eindl 2021 

01:30:00 PM EDT                                     [facility] ([facility]

[facility]

 

                Outpatient      Attender: Dunia Matson/Copeland/Darci/R

eindl 2021 

10:30:00 AM EDT                                     [facility] ([facility]

actice, )

 

                Outpatient      Attender: Indio Napoles 0

2021 09:40:00 AM

EDT                                                 [facility] ([facility]

)

 

                Outpatient                      [address] [place]

[place] [address] 2021 12:00:00 AM

EST                                                 eCW1 ([facility])

 

                Unknown                         [address] [place]

[place] [address] 2021 12:00:00 AM 

EST                                                 eCW1 ([facility])

 

                Outpatient      Attender: Indio Napoles 1

 08:00:00 AM

EST                                                 [facility] ([facility]

)



                                                                                
                                                         



Immunizations

          



             Vaccine      Date         Status       Description  Data Source(s)

 

             COVID-19 VACCINE Moderna 2021 12:00:00 AM EDT completed      

           [facility]

 

                                        Vaccine Series Complete: YESThis Data wa

s Submitted to [facility]. 

 

                    COVID-19 VACCINE, MRNA-1273, LNP-S (MODERNA)/PF 2021 1

2:00:00 AM EDT 

completed                                           [facility]

 

                    COVID-19 VACCINE, MRNA-1273, LNP-S (MODERNA)/PF 2021 1

2:00:00 AM EST 

completed                                           [facility]

 

                                        This CVX code allows reporting of a vacc

ination when formulation is unknown (for

example, when recording a Influenza vaccination when noted on a vaccination 
card)           2020 08:28:00 AM EDT completed                       [facility] ([facility])

 

                          INFLUENZA VACCINE QUADRIVALENT  (65 YR UP)/MF59

C.1/PF 2020 12:00:00

AM EDT              completed                               [facility]



                                                                                
                                               



Medications

          



          Medication Brand Name Start Date Product Form Dose      Route     Admi

nistrative 

Instructions Pharmacy Instructions Status     Indications Reaction   Description

 Data 

Source(s)

 

          40 mg               09/15/2021 12:00:00 AM EDT capsule,delayed release

(DR/EC) 30                  TAKE ONE 

CAPSULE BY MOUTH EVERY DAY TAKE ONE CAPSULE BY MOUTH EVERY DAY SOLD: 2021 

   

                                                            [facility]

 

                    Omeprazole 40 MG Delayed Release Oral Capsule Omeprazole    

      2021 12:00:00 AM 

EDT                  ORAL                 active                      [facility] ([facility], )

 

        240 mcg/0.7 mL         2021 12:00:00 AM EDT syringe 0             

  INJECT AS DIRECTED 

INJECT AS DIRECTED SOLD: 2021                                        [facility]

[facility]

 

                    SUPREP BOWEL PREP KIT 17.5-3.13-1.6 gram SODIUM, POTASSIUM,M

AG SULFATES 

2021 12:00:00 AM EDT recon soln      354                             TAKE 

PER DOCTOR'S BOWEL PREP 

INSTRUCTIONS TAKE PER DOCTOR'S BOWEL PREP INSTRUCTIONS SOLD: 2021         

                         

[facility]

 

        Suprep Bowel Prep Kit Suprep Bowel Prep Kit 2021 12:00:00 AM EDT  

                                

             completed                                           [facility] ([facility], )

 

          100 mg              2021 12:00:00 AM EDT tablet    180          

       TAKE TWO TABLETS BY MOUTH EVERY

DAY        TAKE TWO TABLETS BY MOUTH EVERY DAY SOLD: 2021                 

                 [facility]

 

          100 mg              2021 12:00:00 AM EDT tablet    180          

       TAKE TWO TABLETS BY MOUTH EVERY

DAY        TAKE TWO TABLETS BY MOUTH EVERY DAY SOLD: 2021                 

                 [facility]

 

       Covid-19 vaccine, Unspecified        2021 12:00:00 AM EDT          

                          completed  

                                                            [facility] ([facility])

 

                                        Medication administered onsite 

 

                atorvastatin 20 MG Oral Tablet ATORVASTATIN CALCIUM 2021 1

2:00:00 AM EDT 

tablet          90                              TAKE ONE TABLET BY MOUTH EVERY D

AY TAKE ONE TABLET BY MOUTH EVERY 

DAY          SOLD: 2021                                        [facility]

s

 

                atorvastatin 20 MG Oral Tablet ATORVASTATIN CALCIUM 2021 1

2:00:00 AM EDT 

tablet          90                              TAKE ONE TABLET BY MOUTH EVERY D

AY TAKE ONE TABLET BY MOUTH EVERY 

DAY          SOLD: 2021                                        [facility]

s

 

                atorvastatin 20 MG Oral Tablet ATORVASTATIN CALCIUM 2021 1

2:00:00 AM EDT 

tablet          90                              TAKE ONE TABLET BY MOUTH EVERY D

AY TAKE ONE TABLET BY MOUTH EVERY 

DAY          SOLD: 2021                                        [facility]

[facility]

 

       Covid-19 vaccine, Unspecified        2021 12:00:00 AM EST          

                          completed  

                                                            [facility]

 

                                        Medication administered onsite 

 

          Finasteride 5 MG Oral Tablet FINASTERIDE 2021 12:00:00 AM EST ta

blet    90                  

TAKE ONE TABLET BY MOUTH EVERY DAY TAKE ONE TABLET BY MOUTH EVERY DAY SOLD: 

2021                                                      [facility]

 

          Finasteride 5 MG Oral Tablet FINASTERIDE 2021 12:00:00 AM EST ta

blet    90                  

TAKE ONE TABLET BY MOUTH EVERY DAY TAKE ONE TABLET BY MOUTH EVERY DAY SOLD: 

2021                                                      [facility]

 

          Finasteride 5 MG Oral Tablet FINASTERIDE 2021 12:00:00 AM EST ta

blet    90                  

TAKE ONE TABLET BY MOUTH EVERY DAY TAKE ONE TABLET BY MOUTH EVERY DAY SOLD: 

2021                                                      [facility]

 

          500-125 mg           2020 12:00:00 AM EST tablet    30          

        TAKE ONE TABLET BY MOUTH 

THREE TIMES A DAY FOR 10 DAYS           TAKE ONE TABLET BY MOUTH THREE TIMES A D

AY FOR 10 

DAYS         SOLD: 2020                                        [facility]

s

 

          5-325 mg            2020 12:00:00 AM EST tablet    32           

       TAKE 1-2 TABLETS BY MOUTH 

EVERY 4 HOURS AS NEEDED FOR PAIN MAXIMUM DAILY DOSE = 8 TAKE 1-2 TABLETS BY 

MOUTH EVERY 4 HOURS AS NEEDED FOR PAIN MAXIMUM DAILY DOSE = 8 SOLD: 2020  

         

                                                            [facility]

 

          800 mg              2020 12:00:00 AM EST tablet    21           

       TAKE ONE TABLET BY MOUTH EVERY 8

HOURS AS NEEDED WITH FOOD UP TO 7 DAYS  TAKE ONE TABLET BY MOUTH EVERY 8 HOURS A

S

NEEDED WITH FOOD UP TO 7 DAYS SOLD: 2020                                  

      [facility]

 

          Finasteride 5 MG Oral Tablet FINASTERIDE 2020 12:00:00 AM EDT ta

blet    30                  

TAKE ONE TABLET BY MOUTH EVERY DAY TAKE ONE TABLET BY MOUTH EVERY DAY SOLD: 

2020                                                      [facility]

 

          Finasteride 5 MG Oral Tablet FINASTERIDE 2020 12:00:00 AM EDT ta

blet    30                  

TAKE ONE TABLET BY MOUTH EVERY DAY TAKE ONE TABLET BY MOUTH EVERY DAY SOLD: 

2020                                                      [facility]

 

          Finasteride 5 MG Oral Tablet FINASTERIDE 2020 12:00:00 AM EDT ta

blet    30                  

TAKE ONE TABLET BY MOUTH EVERY DAY TAKE ONE TABLET BY MOUTH EVERY DAY SOLD: 

10/28/2020                                                      [facility]

 

          Finasteride 5 MG Oral Tablet FINASTERIDE 2020 12:00:00 AM EDT ta

blet    30                  

TAKE ONE TABLET BY MOUTH EVERY DAY TAKE ONE TABLET BY MOUTH EVERY DAY SOLD: 

2020                                                      [facility]

 

                atorvastatin 20 MG Oral Tablet ATORVASTATIN CALCIUM 2020 1

2:00:00 AM EDT 

tablet          90                              TAKE ONE TABLET BY MOUTH EVERY D

AY TAKE ONE TABLET BY MOUTH EVERY 

DAY          SOLD: 2020                                        [facility]

s

 

                atorvastatin 20 MG Oral Tablet ATORVASTATIN CALCIUM 2020 1

2:00:00 AM EDT 

tablet          90                              TAKE ONE TABLET BY MOUTH EVERY D

AY TAKE ONE TABLET BY MOUTH EVERY 

DAY          SOLD: 2020                                        [facility]

s

 

          100 mg              2020 12:00:00 AM EDT tablet    180          

       TAKE TWO TABLETS BY MOUTH EVERY

DAY        TAKE TWO TABLETS BY MOUTH EVERY DAY SOLD: 2021                 

                 [facility]

 

          100 mg              2020 12:00:00 AM EDT tablet    180          

       TAKE TWO TABLETS BY MOUTH EVERY

DAY        TAKE TWO TABLETS BY MOUTH EVERY DAY SOLD: 10/25/2020                 

                 [facility]

 

          Finasteride 5 MG Oral Tablet FINASTERIDE 2020 12:00:00 AM EDT ta

blet    30                  

TAKE ONE TABLET BY MOUTH EVERY DAY TAKE ONE TABLET BY MOUTH EVERY DAY SOLD: 

2020                                                      [facility]



                                                                                
                                                                                
                                                                                
                                                                                
    



Insurance Providers

          



             Payer name   Policy type / Coverage type Policy ID    Covered party

 ID Covered 

party's relationship to gomez Policy Gomez             Plan Information

 

          Medicare [employer] Medicare Primary           [id number]      Self   

              

 

          MEDICARE            [id number]           [place]                  [id number]

A

 

                Medicare [employer] Medicare Primary [id number]      

[id number] Self                                    0

[id number]

 

                Medicare [employer] Medicare Primary [id number]      

[id number] Self                                    0

[id number]

 

                Medicare Natl Govt [facility] Medicare Primary [id number]      

[id number] Self                                    0

[id number]

 

                              [id number]                               [id number]

A

 

          Medicaid  Medigap Part B [id number]  [id number]

4.0 Self                

[id number]

 

                    [employer]are Part B [id number]   

                            

[id number]

 

                    [employer]-Medicaid [id number]          

                     

[id number]

 

                    ANSI-Medicare Part B [id number]   

                            

[id number]

 

                    ANSI-Medicare Part B [id number]   

                            

[id number]

 

                    ANSI-Medicare Part B [id number]   

                            

[id number]

 

          [facility] MEDICAID           [id number]            SP                  [id number]

E

 

          Medicaid  Medigap Part B [id number]  [id number]

4.0 Self                

[id number]

 

          Medicaid  Medigap Part B [id number]  [id number]

4.0 Self                

[id number]

 

          MEDICARE            [id number]           SP                  [id number]

A

 

          Medicaid  Medigap Part B 1 1       [id number]     Self                1 1

 

          Medicaid [place] Medigap Part B           [id number]

7068.0 Self                 

 

             Medicare [facility] Medicare Primary              [id number]

[id number] 

Self                                                 

 

                              [id number]                                [id number]

 

                    ANSI-Medicaid [id number]          

                     

[id number]

 

          MEDICARE            [id number]           SP                  [id number]

J89

 

          EMEDNY              [id number]            SP                  [id number]

 

          MEDICAID  M         [id number]  [id number] S                   [id number]

 

          MEDICARE  C         [id number]                   [id number]

[id number]

 

          MEDICAID            [id number]            SP                  [id number]



                                                                                
                                                                                
                                                                                
                                                                                
              



Problems, Conditions, and Diagnoses

          



           Code       Display Name Description Problem Type Effective Dates Data

 Source(s)

 

                    N40.1               Benign prostatic hypertrophy with outflo

w obstruction BPH loc w urin 

obs/LUTS            Problem             2021 12:00:00 AM EST [id number] ([facility])



                                                                                
                 



Surgeries/Procedures

          



             Procedure    Description  Date         Indications  Data Source(s)

 

             OFFICE OUTPATIENT VISIT 25 MINUTES              2021 12:00:00

 AM EDT              [facility] 

([facility], )

 

             Endoscopy Upper GI Balloon Dilation Of Esophagus               12:00:00 AM EDT              

[facility] ([facility], )

 

                    Endoscopy Upper GI Remove Tumor/Polyp/Lesion Snare Technique

                     2021 

12:00:00 AM EDT                                     [facility] ([facility]

actice, )

 

             Colonoscopy W/ Poly              2021 12:00:00 AM EDT        

      [facility] ([facility], )

 

             OFFICE OUTPATIENT NEW 45 MINUTES              2021 12:00:00 A

M EDT              [facility] 

([facility], )

 

                    Chronic Care MGMT 20 Mins Clinical Staff Time Per Calendar M

[facility]                     2021 

12:00:00 AM EDT                                     [facility] ([facility]

)

 

             OFFICE OUTPATIENT VISIT 25 MINUTES              2021 12:00:00

 AM EDT              [facility] 

([facility])

 

             Chronic Care Management Services Ea Addl 20 Min              2021 12:00:00 AM EDT              

[facility] ([facility])

 

                    Chronic Care MGMT 20 Mins Clinical Staff Time Per Calendar M

[facility]                     2021 

12:00:00 AM EDT                                     [facility] ([facility]

)

 

             Chronic Care Management Services Ea Addl 20 Min              2021 12:00:00 AM EDT              

[facility] ([facility])

 

                    Chronic Care MGMT 20 Mins Clinical Staff Time Per Calendar M

[facility]                     2021 

12:00:00 AM EDT                                     [facility] ([facility]

)

 

             Chronic Care Management Services Ea Addl 20 Min              2021 12:00:00 AM EST              

[facility]

 

                    Chronic Care MGMT 20 Mins Clinical Staff Time Per Calendar M

[facility]                     2021 

12:00:00 AM EST                                     [facility] ([facility]

)



                                                                                
                                                                                
                                              



Results

          



                    ID                  Date                Data Source

 

                    [id number]         2021 12:46:00 PM EDT [facility] ([facility], )









          Name      Value     Range     Interpretation Code Description Data Uma

rce(s) Supporting 

Document(s)

 

           Surgical pathology study Laboratory test result                      

            [facility] ([facility], )                            

 

                                        FINAL DIAGNOSIS



A-Gastric polyp, polypectomy:

Polypoid fragments of gastric mucosa with focal foveolar hyperplasia,

mild chronic inflammation and reactive changes.

No H.pylori is identified.



B-Colon polyps, polypectomy:

Tubular adenoma, fragments.

Separate fragments of hyperplastic polyp.

                                        2021 - 



CLINICAL DIAGNOSIS



H/O polyps, dysphagia

                                        2021 - 1450



GROSS DIAGNOSIS



A - Received in formalin labeled "gastric polyp" and consists of

fragment of tissue 0.3 x 0.2 x 0.1 cm.  All in one.



B - Received in formalin labeled "colon polyps" and consists of

fragments of tissue 0.5 x 0.5 x 0.2 cm. in aggregate.  All in one.

                                        -DARRIN

                                        2021 - 



Signed________ RIKKI AMBRIZ MD 2021 [id number]

 









                    ID                  Date                Data Source

 

                    [id number]           2021 09:45:00 AM EDT [facility]









          Name      Value     Range     Interpretation Code Description Data Uma

rce(s) Supporting 

Document(s)

 

                                        SARS-CoV-2 (COVID-19) RNA [Presence] in 

Respiratory specimen by MARY with probe 

detection  Not Detected                                  [facility]      

 

                                        This lab was ordered by [facility] and reported by [employer] 









                    ID                  Date                Data Source

 

                    [id number]          2021 09:45:00 AM EDT [facility]









          Name      Value     Range     Interpretation Code Description Data Uma

rce(s) Supporting 

Document(s)

 

           Coronavirus 2019 Nasopharygeal Laboratory test result                

                  [facility] ([facility])                              

 

                                        ASSAY INFORMATION: Real Time RT-PCR

NOTE: The COVID-19 assay has been cleared by the U.S. Food and Drug

Administration under the Emergency Use Authorization (EUA). [facility] and [facility] are designated as high complexity laboratories by the

Clinical Laboratory Improvement Amendments of 1988(CLIA) and are qualified to

perform this test.



Not Detected



 









                    ID                  Date                Data Source

 

                    [id number]          2021 09:41:00 AM EDT [facility]









          Name      Value     Range     Interpretation Code Description Data Uma

rce(s) Supporting 

Document(s)

 

           Prostate specific Ag [Mass/volume] in Serum or Plasma 1.55 ng/mL     

                             [facility] 

([facility]                   

 

                                        This assay was performed on the Siemens 

Dimension EXL using the B-

Galactosidase/CPRG methodology and should not be compared interchangeably with 
other methods.  The PSA should not be used alone as a screening test for the 
presence or absence of malignant disease.

 









                    ID                  Date                Data Source

 

                    [id number]          2021 09:41:00 AM EDT [facility]









          Name      Value     Range     Interpretation Code Description Data Uma

rce(s) Supporting 

Document(s)

 

           Cholesterol [Mass/volume] in Serum or Plasma 160 mg/dL  131-200      

                    [facility] 

([facility])                   

 

           Triglyceride [Mass/volume] in Serum or Plasma 158 mg/dL        

                     [facility] 

([facility])                   

 

                    Cholesterol in LDL [Mass/volume] in Serum or Plasma by calcu

lation 71 CALC             

                                          [facility] ([facility])  

 

           Cholesterol in HDL [Mass/volume] in Serum or Plasma 57 mg/dL   35-60 

                           [facility] 

([facility])                   









                    ID                  Date                Data Source

 

                    [id number]          2021 09:41:00 AM EDT [facility] ([facility])









          Name      Value     Range     Interpretation Code Description Data Uma

rce(s) Supporting 

Document(s)

 

           Glucose [Mass/volume] in Serum or Plasma 103 mg/dL  74-99            

                [facility] ([facility])                              

 

                                        100-125 mg/dL     PRE-DIABETES/FASTING

>126 mg/dL          DIABETES/FASTING

 

 

          Creatinine 0.8 mg/dL 0.6-1.3                       [facility] ([facility]

[facility]tern[facility])  

 

           Urea nitrogen [Mass/volume] in Serum or Plasma 21 mg/dL   7-18       

                      [facility] 

([facility])                   

 

           Sodium [Moles/volume] in Serum or Plasma 138 meq/L  136-145          

                [facility] ([facility])                             

 

           Potassium [Moles/volume] in Serum or Plasma 4.5 meq/L  3.5-5.1       

                   [facility] 

([facility])                   

 

           Carbon dioxide, total [Moles/volume] in Serum or Plasma 22 meq/L   21

-32                            

[facility] ([facility])            

 

           Chloride [Moles/volume] in Serum or Plasma 104 meq/L           

                  [facility] 

([facility])                   

 

           Calcium [Mass/volume] in Serum or Plasma 9.5 mg/dL  8.5-10.1         

                [facility] 

([facility])                   

 

                    Alkaline phosphatase isoenzyme [Units/volume] in Serum or Pl

asma 70 mg/dL            

                                                [facility] ([facility])  

 

          Total Bilirubin 0.6 mg/dL 0.2-1.0                       [facility] ([facility])  

 

                          Aspartate aminotransferase [Enzymatic activity/volume]

 in Serum or Plasma 25 U/L

             15-37                                  [facility] ([facility]

)  

 

                    Alanine aminotransferase [Enzymatic activity/volume] in Seru

m or Plasma 58 U/L              

12-78                                           [facility] ([facility])  

 

           Albumin [Mass/volume] in Serum or Plasma 4.2 g/dL   3.4-5.0          

                [facility] ([facility])                              

 

          A/G Ratio 1.45 CALC 1.00-1.90                     [facility] ([facility])  

 

                                        Glomerular filtration rate/1.73 sq M pre

dicted among non-blacks [Volume 

Rate/Area] in Serum or Plasma by Creatinine-based formula (MDRD) Laboratory test

result                                              [facility]

)  

 

           Proteinase 3 Ab [Units/volume] in Serum 7.1 g/dL   6.4-8.2           

               [facility]                              

 

                                        Glomerular filtration rate/1.73 sq M pre

dicted among blacks [Volume Rate/Area] 

in Serum or Plasma by Creatinine-based formula (MDRD) Laboratory test result    

                  

                                        [facility] ([facility])  

 

                                        <content>CHRONIC KIDNEY DISEASE STAGING 

PER 

NKF</content><br/><content></content><br/><content>STAGE I & II      GFR >= 60  
     NORMAL TO MILDLY DECREASED</content><br/><content>STAGE III          GFR 
30-59          MODERATELY DECREASED</content><br/><content>STAGE IV           
GFR 15-29         SEVERELY DECREASED</content><br/><content>STAGE V            
GFR <15            VERY LITTLE GFR LEFT</content><br/><content>ESRD             
   GFR <15            ON RRT</content><br/><content></content> 









                    ID                  Date                Data Source

 

                    [id number]          2021 09:41:00 AM EDT [facility]









          Name      Value     Range     Interpretation Code Description Data Uma

rce(s) Supporting 

Document(s)

 

           Erythrocytes [#/volume] in Blood by Automated count 5.02 x10*6/UL 4.2

0-6.30                        

[facility]            

 

           Leukocytes [#/volume] in Blood by Automated count 6.8 x10*3/UL 4.1-10

.9                         

[facility]            

 

          MCV       92.6 fL   80.0-97.0                     [facility] ([facility])  

 

           Hemoglobin [Mass/volume] in Blood 15.5 g/dL  12.0-18.0               

         [facility]                              

 

           Hematocrit [Volume Fraction] of Blood by Automated count 46.5 %     3

7.0-51.0                        

[facility]            

 

          MCH       30.9 pg   26.0-32.0                     [facility]  

 

          MCHC      33.4 g/dL 31.0-38.0                     [facility] ([facility])  

 

           Erythrocyte distribution width [Ratio] by Automated count 13.7 %     

11.6-13.7                        

[facility] ([facility])            

 

           Platelets [#/volume] in Blood by Automated count 238 x10*3/-440

                          [facility]

 ([facility])                  

 

          MPV       7.7 FL    7.8-11.0                      [facility] [facility])  

 

          Lymph %   30.1 %    10.0-58.5                     [facility] ([facility])  

 

          Lymph #   2.0 x10*3/UL 0.6-4.1                       [facility] ([facility]  

 

          Neut %    63.0 %    37.0-92.0                     [facility] [facility])  

 

          Mid %     6.9 %     1.7-9.3                       [facility] ([facility])  

 

          Neut #    4.3 x10*3/UL 2.0-7.8                       [facility] ([facility])  

 

          Mid #     0.5 x10*3/UL 0.1-0.6                       [facility] ([facility])  









                    ID                  Date                Data Source

 

                    [id number]                 2021 12:00:00 AM EST [facility]









          Name      Value     Range     Interpretation Code Description Data Uma

rce(s) Supporting 

Document(s)

 

          SARS-CoV2 Rapid Antigen Negative                                [facility]

     

 

                                        This lab was ordered by [facility] and reported by [facility]. 









                    ID                  Date                Data Source

 

                    [id number]          2020 09:22:00 AM EST [facility] ([facility])









          Name      Value     Range     Interpretation Code Description Data Uma

rce(s) Supporting 

Document(s)

 

           Urate [Mass/volume] in Serum or Plasma 4.7 mg/dL  3.5-7.2            

              [facility] ([facility]                              









                    ID                  Date                Data Source

 

                    [id number]          2020 09:21:00 AM EST [facility] ([facility])









          Name      Value     Range     Interpretation Code Description Data Uma

rce(s) Supporting 

Document(s)

 

           Cholesterol [Mass/volume] in Serum or Plasma 143 mg/dL  131-200      

                    [facility] 

([facility])                   

 

           Triglyceride [Mass/volume] in Serum or Plasma 47 mg/dL         

                     [facility] 

([facility])                   

 

           Cholesterol in HDL [Mass/volume] in Serum or Plasma 79 mg/dL   35-60 

                           [facility] 

([facility])                   

 

                    Cholesterol in LDL [Mass/volume] in Serum or Plasma by calcu

lation 55 CALC             

                                          [facility] ([facility])  









                    ID                  Date                Data Source

 

                    [id number]          2020 09:21:00 AM EST [facility] ([facility])









          Name      Value     Range     Interpretation Code Description Data Uma

rce(s) Supporting 

Document(s)

 

           Urea nitrogen [Mass/volume] in Serum or Plasma 18 mg/dL   7-18       

                      [facility] 

([facility])                   

 

           Glucose [Mass/volume] in Serum or Plasma 94 mg/dL   74-99            

                [facility] ([facility])                              

 

                                        100-125 mg/dL     PRE-DIABETES/FASTING

>126 mg/dL          DIABETES/FASTING

 

 

          Creatinine 1.0 mg/dL 0.6-1.3                       [facility] ([facility]

[facility])  

 

           Sodium [Moles/volume] in Serum or Plasma 138 meq/L  136-145          

                [facility] ([facility]                             

 

           Potassium [Moles/volume] in Serum or Plasma 4.8 meq/L  3.5-5.1       

                   [facility] 

([facility])                   

 

           Chloride [Moles/volume] in Serum or Plasma 102 meq/L           

                  [facility] 

([facility])                   

 

           Carbon dioxide, total [Moles/volume] in Serum or Plasma 24 meq/L   21

-32                            

[facility] ([facility])            

 

           Calcium [Mass/volume] in Serum or Plasma 8.9 mg/dL  8.5-10.1         

                [facility] 

([facility])                   

 

          Total Bilirubin 0.5 mg/dL 0.2-1.0                       [facility] ([facility])  

 

                    Alkaline phosphatase isoenzyme [Units/volume] in Serum or Pl

asma 68 mg/dL            

                                                [facility] ([facility])  

 

                          Aspartate aminotransferase [Enzymatic activity/volume]

 in Serum or Plasma 30 U/L

             15-37                                  [facility] ([facility]

)  

 

           Albumin [Mass/volume] in Serum or Plasma 4.1 g/dL   3.4-5.0          

                [facility] ([facility]                              

 

                    Alanine aminotransferase [Enzymatic activity/volume] in Seru

m or Plasma 56 U/L              

12-78                                           [facility] ([facility])  

 

           Proteinase 3 Ab [Units/volume] in Serum 7.0 g/dL   6.4-8.2           

               [facility]                              

 

          A/G Ratio 1.41 CALC 1.00-1.90                     [facility] [facility]  

 

                                        Glomerular filtration rate/1.73 sq M pre

dicted among blacks [Volume Rate/Area] 

in Serum or Plasma by Creatinine-based formula (MDRD) Laboratory test result    

                  

                                        [facility]  

 

                                        <content>CHRONIC KIDNEY DISEASE STAGING 

PER 

[facility]</content><br/><content></content><br/><content>STAGE I & II      GFR >= 60  
     NORMAL TO MILDLY DECREASED</content><br/><content>STAGE III          GFR 
30-59          MODERATELY DECREASED</content><br/><content>STAGE IV           
GFR 15-29         SEVERELY DECREASED</content><br/><content>STAGE V            
GFR <15            VERY LITTLE GFR LEFT</content><br/><content>ESRD             
   GFR <15            ON RRT</content><br/><content></content> 

 

                                        Glomerular filtration rate/1.73 sq M pre

dicted among non-blacks [Volume 

Rate/Area] in Serum or Plasma by Creatinine-based formula (MDRD) Laboratory test

result                                              [facility]

)  









                    ID                  Date                Data Source

 

                    [id number]          2020 09:21:00 AM EST [facility]









          Name      Value     Range     Interpretation Code Description Data Uma

rce(s) Supporting 

Document(s)

 

           Leukocytes [#/volume] in Blood by Automated count 8.5 x10*3/UL 4.1-10

.9                         

[facility] ([facility])            

 

           Erythrocytes [#/volume] in Blood by Automated count 4.76 x10*6/UL 4.2

0-6.30                        

[facility]            

 

           Hemoglobin [Mass/volume] in Blood 15.2 g/dL  12.0-18.0               

         [facility]                              

 

           Hematocrit [Volume Fraction] of Blood by Automated count 43.7 %     3

7.0-51.0                        

[facility] ([facility])            

 

          MCV       91.8 fL   80.0-97.0                     [facility] ([facility] [facility]

[facility])  

 

          MCH       31.9 pg   26.0-32.0                     [facility] ([facility] [facility]

[facility])  

 

          MCHC      34.7 g/dL 31.0-38.0                     [facility] ([facility] [facility]

[facility])  

 

           Platelets [#/volume] in Blood by Automated count 271 x10*3/-440

                          [facility]

 ([facility])                  

 

           Erythrocyte distribution width [Ratio] by Automated count 13.3 %     

11.6-13.7                        

[facility] ([facility])            

 

          MPV       8.0 FL    7.8-11.0                      [facility] ([facility] [facility]

[facility])  

 

          Lymph %   19.5 %    10.0-58.5                     [facility] ([facility] [facility]

[facility])  

 

          Mid %     5.2 %     1.7-9.3                       [facility] ([facility]

[facility])  

 

          Neut %    75.3 %    37.0-92.0                     [facility] ([facility]

[facility])  

 

          Lymph #   1.6 x10*3/UL 0.6-4.1                       [facility] ([facility])  

 

          Mid #     0.5 x10*3/UL 0.1-0.6                       [facility] ([facility])  

 

          Neut #    6.4 x10*3/UL 2.0-7.8                       [facility] ([facility])  







                                        Procedure

 

                                          



                                                                                
                                                                                
                                                



Social History

          



           Code       Duration   Value      Status     Description Data Source(s

)

 

           Smoking    2021 12:00:00 AM EST Former Smoker completed  Former

 Smoker eCW1 

([facility])

 

           Smoking    2021 12:00:00 AM EST Former Smoker completed  Former

 Smoker W1 

([facility])



                                                                                
                            



Vital Signs

          



                    ID                  Date                Data Source

 

                    UNK                                      









           Name       Value      Range      Interpretation Code Description Data

 Source(s)

 

           Body weight 172.00 [lb_av]                       172.00 [lb_av] [facility]

T ([facility], )

 

           Body height 66.5 [in_i]                       66.5 [in_i] [facility] ([facility])

 

                                        5'6.50" 

 

           Systolic blood pressure 148 mm[Hg]                       148 mm[Hg] M

[facility]

 

           Diastolic blood pressure 76 mm[Hg]                        76 mm[Hg]  

[facility] ([facility])

 

           Heart rate 83 /min                          83 /min    [facility] ([facility])

 

           Body temperature 98.3 [degF]                       98.3 [degF] [facility]

 ([facility])

 

           Body mass index (BMI) [Ratio] 27.3 kg/m2                       27.3 k

g/m2 [facility] ([facility])

 

           Ideal body weight 142 [lb_av]                       142 [lb_av] [facility]

T ([facility])

 

           Body weight 78.019 kg                        78.019 kg  [facility] ([facility])

 

           Body surface area Derived from formula 1.89 m2                       

   1.89 m2    [facility] ([facility])

 

           Systolic blood pressure 135 mm[Hg]                       135 mm[Hg] [facility] ([facility])

 

           Diastolic blood pressure 74 mm[Hg]                        74 mm[Hg]  

[facility] ([facility])

 

           Body weight 171.38 [lb_av]                       [facility].38 [facility]

[facility] ([facility]

 

           Body mass index (BMI) [Ratio] 27.2 kg/m2                       27.2 k

g/m2 [facility] ([facility])

 

           Body temperature 98.4 [degF]                       98.4 [degF] [facility]

 ([facility])

 

           Body height 66.5 [in_i]                       66.5 [in_i] [facility] ([facility])

 

                                        5'6.50" 

 

           Ideal body weight 142 [lb_av]                       142 [facility]_av] [facility]

[facility] ([facility]

 

           Body weight 77.736 kg                        77.736 kg  [facility] ([facility])

 

           Body surface area Derived from formula 1.88 m2                       

   1.88 m2    [facility] ([facility])

 

           Body mass index (BMI) [Ratio] 26.9 kg/m2                       26.9 k

g/m2 [facility] ([facility]

 

           Systolic blood pressure 136 mm[Hg]                       136 mm[Hg] M

[facility]

 

           Diastolic blood pressure 80 mm[Hg]                        80 mm[Hg]  

[facility] ([facility])

 

           Body height 66.50 [in_i]                       66.50 [in_i] [facility] ([facility])

 

                                        5'6.50" 

 

           Body weight 169.12 [lb_av]                       169.12 [lb_av] [facility]

T ([facility])

 

           Body weight 169.2 [lb_av]                       169.2 [lb_av] eCW1 ([facility])

 

           Body height 66 [in_i]                        66 [in_i]  eCW1 ([facility])

 

           Body mass index (BMI) [Ratio] 27.31 kg/m2                       27.31

 kg/m2 eCW1 ([facility])

 

           Heart rate 72 /min                          72 /min    eCW1 ([facility])

 

           Respiratory rate 18 /min                          18 /min    eCW1 ([facility])

 

           Body temperature 97.9 [degF]                       97.9 [degF] eCW1 (

[facility])

 

           Systolic blood pressure 154 mm[Hg]                       154 mm[Hg] e

CW1 ([facility])

 

           Diastolic blood pressure 80 mm[Hg]                        80 mm[Hg]  

eCW1 ([facility])

 

           Systolic blood pressure 136 mm[Hg]                       136 mm[Hg] M

BERTO ([facility])

 

           Body mass index (BMI) [Ratio] 26.7 kg/m2                       26.7 k

g/m2 [facility] ([facility]

 

           Diastolic blood pressure 72 mm[Hg]                        72 mm[Hg]  

[facility] ([facility])

 

           Heart rate 78 /min                          78 /min    [facility] ([facility])

 

           Body height 66.50 [in_i]                       66.50 [in_i] [facility] ([facility])

 

                                        5'6.50" 

 

           Body weight 168.00 [lb_av]                       168.00 [lb_av] [facility]

T ([facility])

 

           Oxygen saturation in Arterial blood by Pulse oximetry 98 %           

                  98 %       [facility] 

([facility])

## 2021-10-16 NOTE — CCD
Continuity of Care Document (CCD)

                             Created on: 2021



Adal Casillas

External Reference #: [id number]

: 1950

Sex: Male



Demographics





                          Address                   [address] [place]  [address]

 

                          Home Phone                [phone]

 

                          Preferred Language        Unknown

 

                          Marital Status            Unknown

 

                          Yazidi Affiliation     Unknown

 

                          Race                      White

 

                          Ethnic Group              Not  or 





Author





                          Author                    Adal PRICE PA

 

                          Organization              Unknown

 

                          Address                   [address]

[place]  [address]



 

                          Phone                     [phone]







Care Team Providers





                    Care Team Member Name Role                Phone

 

                    Indio Gomez MD @ [facility]                [phone]







Problems





                                        Description

 

                                        No Information Available







Social History





                Type            Date            Description     Comments

 

                Birth Sex                       Unknown          

 

                ETOH Use                        Currently consumes alcohol CASE/

WK 

 

                Tobacco Use     Start: Unknown End: Unknown Patient is a former 

smoker 1/2 ppd x55 

years quit in 2018 

 

                Recreational Drug Use                 Regularly uses Marijuana  







Allergies, Adverse Reactions, Alerts





                                        Description

 

                                        No Known Drug Allergies







Medications





           Active Medications SIG        Qnty       Indications Ordering Provide

r Date

 

                          Omeprazole                     40mg Capsules DR       

            1 by mouth 

daily           30caps          K21.00          Cade Pride M.D. 2021

 

                          Allopurinol                     100mg Tablets         

          2 by mouth every

day                                             Unknown         

 

           Finasteride                     5mg Tablets                   daily  

                          Unknown    



 

                          Aspirin Low Dose                     81mg Tablets DR  

                 1 PO 

Daily                                           Unknown         

 

                          Vitamin B-12                     1000mcg Tablets      

             1 by mouth 

every day                                       Unknown         

 

                          Atorvastatin Calcium                     20mg Tablets 

                  one tab 

once daily                                      Unknown         

 

                                        History Medications

 

                                        Suprep Bowel Prep Kit                   

  17.5-3.13-1.6GM/177ML Solution        

                take per doctor's bowel prep instructions. 354ml                

           Cade Pride M.D.                                    2021 - 2021







Immunizations





                                        Description

 

                                        No Information Available







Vital Signs





                Date            Vital           Result          Comment

 

                2021  1:30pm BP Systolic     148 mmHg         

 

                    BP Diastolic        76 mmHg              

 

                    Heart Rate          83 /min              

 

                    Body Temperature    98.3 F             

 

                    Height              66.5 inches         5'6.50"

 

                    Weight              172.00 lb            

 

                    BMI (Body Mass Index) 27.3 kg/m2           

 

                    Ideal Body Weight   142 lb               

 

                    Weight              78.019 kg            

 

                    BSA (Body Surface Area) 1.89 m2              

 

                2021 10:55am BP Systolic     135 mmHg         

 

                    BP Diastolic        74 mmHg              

 

                    Body Temperature    98.4 F             

 

                    Height              66.5 inches         5'6.50"

 

                    Weight              171.38 lb            

 

                    BMI (Body Mass Index) 27.2 kg/m2           

 

                    Ideal Body Weight   142 lb               

 

                    Weight              77.736 kg            

 

                    BSA (Body Surface Area) 1.88 m2              







Results





        Test    Acquired Date Facility Test    Result  H/L     Range   Note

 

                    Laboratory test finding 2021          [facility]

                                        [address] [place] [address]

           [phone] Pathology Request For Service (SEE NOTE)              

          1







                          1                         FINAL DIAGNOSIS



A-Gastric polyp, polypectomy:

Polypoid fragments of gastric mucosa with focal foveolar hyperplasia,

mild chronic inflammation and reactive changes.

No H.pylori is identified.



B-Colon polyps, polypectomy:

Tubular adenoma, fragments.

Separate fragments of hyperplastic polyp.

                                        2021 - 



CLINICAL DIAGNOSIS



H/O polyps, dysphagia

                                        2021 - 1450



GROSS DIAGNOSIS



A - Received in formalin labeled "gastric polyp" and consists of

fragment of tissue 0.3 x 0.2 x 0.1 cm.  All in one.



B - Received in formalin labeled "colon polyps" and consists of

fragments of tissue 0.5 x 0.5 x 0.2 cm. in aggregate.  All in one.

                                        -OA

                                        2021 - 



Signed________ RIKKI AMBRIZ MD 2021 [id number]









Procedures





                Date            Code            Description     Status

 

                2021      77418           Colonoscopy W/ Poly Completed

 

                2021      41295           Endoscopy Upper GI Remove Tumor/

Polyp/Lesion Snare Technique 

Completed

 

                2021      47674           Endoscopy Upper GI Balloon Dilat

ion Of Esophagus Completed

 

                2021      39140           Office/Outpatient New Moderate M

DM 45-59 Minutes Completed







[employer]





                                        Description

 

                                        No Information Available







Encounters





           Type       Date       Location   Provider   Dx         Diagnosis

 

             Office Visit 2021 10:30a [facility] KENIA Cabello 

Z86.010                                 Personal history of colonic polyps

 

                          R13.10                    Dysphagia, unspecified

 

                          K57.30                    Dvrtclos of lg int w/o perfo

ration or abscess w/o bleeding







Assessments





                Date            Code            Description     Provider

 

                2021      D12.6           Benign neoplasm of colon, unspec

ified KENIA Lancaster

 

                    2021          K21.00              Gastro-esophageal re

flux disease with esophagitis, without 

bleeding                                KENIA Lancaster

 

                    2021          K57.30              Diverticulosis of la

rge intestine without perforation or 

abscess without bleeding                KENIA Lancaster

 

                2021      Z12.11          Encounter for screening for vikash

gnant neoplasm of colon Cade Pride M.D.

 

                2021      Z86.010         Personal history of colonic poly

ps Cade Pride M.D.

 

                2021      D12.6           Benign neoplasm of colon, unspec

ified Cade Pride M.D.

 

                2021      K31.7           Polyp of stomach and duodenum Ro

ceci Pride M.D.

 

                    2021          K57.30              Diverticulosis of la

rge intestine without perforation or 

abscess without bleeding                Cade Pride M.D.

 

                2021      R13.10          Dysphagia, unspecified Cade barney M.D.

 

                2021      K22.2           Esophageal obstruction Cade barney M.D.

 

                2021      K31.89          Other diseases of stomach and du

odenum Cade Pride M.D.

 

                    2021          K21.00              Gastro-esophageal re

flux disease with esophagitis, without 

bleeding                                Cade Pride M.D.

 

                2021      Z86.010         Personal history of colonic poly

ps KENIA Lancaster

 

                2021      R13.10          Dysphagia, unspecified KENIA Cabello

 

                    2021          K57.30              Diverticulosis of la

rge intestine without perforation or 

abscess without bleeding                KENIA Lancaster







Plan of Treatment

2021 - KENIA Lancaster* D12.6 Benign neoplasm of colon, unspecified

* K21.00 Gastro-esophageal reflux disease with esophagitis, without bleeding* 
  New Medication:* Omeprazole 40 mg - 1 by mouth daily





* K57.30 Diverticulosis of large intestine without perforation or abscess 
  without bleeding





Functional Status





                                        Description

 

                                        No Information Available







Mental Status





                                        Description

 

                                        No Information Available







Referrals





                                        Description

 

                                        No Information Available

## 2021-10-16 NOTE — CCD
Continuity of Care Document (CCD)

                             Created on: 2021



Adal Casillas

External Reference #: [id number]

: 1950

Sex: Male



Demographics





                          Address                   [address] [place]  [address]

 

                          Home Phone                [phone]

 

                          Preferred Language        Unknown

 

                          Marital Status            Unknown

 

                          Caodaism Affiliation     Unknown

 

                          Race                      White

 

                          Ethnic Group              Not  or 





Author





                          Author                    Adal PRICE PA

 

                          Organization              Unknown

 

                          Address                   [address]

[place]  [address]



 

                          Phone                     [phone]







Care Team Providers





                    Care Team Member Name Role                Phone

 

                    Indio Gomez MD @ [facility]                [phone]







Problems





                                        Description

 

                                        No Information Available







Social History





                Type            Date            Description     Comments

 

                Birth Sex                       Unknown          

 

                ETOH Use                        Currently consumes alcohol CASE/

WK 

 

                Tobacco Use     Start: Unknown End: Unknown Patient is a former 

smoker 1/2 ppd x55 

years quit in 2018 

 

                Recreational Drug Use                 Regularly uses Marijuana  







Allergies, Adverse Reactions, Alerts





                                        Description

 

                                        No Known Drug Allergies







Medications





           Active Medications SIG        Qnty       Indications Ordering Provide

r Date

 

                          Omeprazole                     40mg Capsules DR       

            1 by mouth 

daily           30caps          K21.00          Cade Pride M.D. 2021

 

                          Allopurinol                     100mg Tablets         

          2 by mouth every

day                                             Unknown         

 

           Finasteride                     5mg Tablets                   daily  

                          Unknown    



 

                          Aspirin Low Dose                     81mg Tablets DR  

                 1 PO 

Daily                                           Unknown         

 

                          Vitamin B-12                     1000mcg Tablets      

             1 by mouth 

every day                                       Unknown         

 

                          Atorvastatin Calcium                     20mg Tablets 

                  one tab 

once daily                                      Unknown         

 

                                        History Medications

 

                                        Suprep Bowel Prep Kit                   

  17.5-3.13-1.6GM/177ML Solution        

                take per doctor's bowel prep instructions. 354ml                

           Cade Pride M.D.                                    2021 - 2021







Immunizations





                                        Description

 

                                        No Information Available







Vital Signs





                Date            Vital           Result          Comment

 

                2021  1:30pm BP Systolic     148 mmHg         

 

                    BP Diastolic        76 mmHg              

 

                    Heart Rate          83 /min              

 

                    Body Temperature    98.3 F             

 

                    Height              66.5 inches         5'6.50"

 

                    Weight              172.00 lb            

 

                    BMI (Body Mass Index) 27.3 kg/m2           

 

                    Ideal Body Weight   142 lb               

 

                    Weight              78.019 kg            

 

                    BSA (Body Surface Area) 1.89 m2              

 

                2021 10:55am BP Systolic     135 mmHg         

 

                    BP Diastolic        74 mmHg              

 

                    Body Temperature    98.4 F             

 

                    Height              66.5 inches         5'6.50"

 

                    Weight              171.38 lb            

 

                    BMI (Body Mass Index) 27.2 kg/m2           

 

                    Ideal Body Weight   142 lb               

 

                    Weight              77.736 kg            

 

                    BSA (Body Surface Area) 1.88 m2              







Results





        Test    Acquired Date Facility Test    Result  H/L     Range   Note

 

                    Laboratory test finding 2021          [facility]

                                        [address] [place] [address]

           [phone] Pathology Request For Service (SEE NOTE)              

          1







                          1                         FINAL DIAGNOSIS



A-Gastric polyp, polypectomy:

Polypoid fragments of gastric mucosa with focal foveolar hyperplasia,

mild chronic inflammation and reactive changes.

No H.pylori is identified.



B-Colon polyps, polypectomy:

Tubular adenoma, fragments.

Separate fragments of hyperplastic polyp.

                                        2021 - 



CLINICAL DIAGNOSIS



H/O polyps, dysphagia

                                        2021 - 1450



GROSS DIAGNOSIS



A - Received in formalin labeled "gastric polyp" and consists of

fragment of tissue 0.3 x 0.2 x 0.1 cm.  All in one.



B - Received in formalin labeled "colon polyps" and consists of

fragments of tissue 0.5 x 0.5 x 0.2 cm. in aggregate.  All in one.

                                        -OA

                                        2021 - 



Signed________ RIKKI AMBRIZ MD 2021 1053









Procedures





                Date            Code            Description     Status

 

                2021      18875           Colonoscopy W/ Poly Completed

 

                2021      73467           Endoscopy Upper GI Remove Tumor/

Polyp/Lesion Snare Technique 

Completed

 

                2021      78188           Endoscopy Upper GI Balloon Dilat

ion Of Esophagus Completed

 

                2021      97172           Office/Outpatient New Moderate M

DM 45-59 Minutes Completed







Medical Devices





                                        Description

 

                                        No Information Available







Encounters





           Type       Date       Location   Provider   Dx         Diagnosis

 

             Office Visit 2021 10:30a [facility] KENIA Cabello 

Z86.010                                 Personal history of colonic polyps

 

                          R13.10                    Dysphagia, unspecified

 

                          K57.30                    Dvrtclos of lg int w/o perfo

ration or abscess w/o bleeding







Assessments





                Date            Code            Description     Provider

 

                2021      D12.6           Benign neoplasm of colon, unspec

ified KENIA Lancaster

 

                    2021          K21.00              Gastro-esophageal re

flux disease with esophagitis, without 

bleeding                                KENIA Lancaster

 

                    2021          K57.30              Diverticulosis of la

rge intestine without perforation or 

abscess without bleeding                KENIA Lancaster

 

                2021      Z12.11          Encounter for screening for vikash

gnant neoplasm of colon Cade Pride M.D.

 

                2021      Z86.010         Personal history of colonic poly

ps Cade Pride M.D.

 

                2021      D12.6           Benign neoplasm of colon, unspec

ified Cade rPide M.D.

 

                2021      K31.7           Polyp of stomach and duodenum Ro

ceci Pride M.D.

 

                    2021          K57.30              Diverticulosis of la

rge intestine without perforation or 

abscess without bleeding                Cade Pride M.D.

 

                2021      R13.10          Dysphagia, unspecified Cade barney M.D.

 

                2021      K22.2           Esophageal obstruction Cade barney M.D.

 

                2021      K31.89          Other diseases of stomach and du

odenum Cade Pride M.D.

 

                    2021          K21.00              Gastro-esophageal re

flux disease with esophagitis, without 

bleeding                                Cade Pride M.D.

 

                2021      Z86.010         Personal history of colonic poly

ps KENIA Lancaster

 

                2021      R13.10          Dysphagia, unspecified KENIA Cabello

 

                    2021          K57.30              Diverticulosis of la

rge intestine without perforation or 

abscess without bleeding                KENIA Lancaster







Plan of Treatment

2021 - KENIA Lancaster* D12.6 Benign neoplasm of colon, unspecified

* K21.00 Gastro-esophageal reflux disease with esophagitis, without bleeding* 
  New Medication:* Omeprazole 40 mg - 1 by mouth daily





* K57.30 Diverticulosis of large intestine without perforation or abscess 
  without bleeding





Functional Status





                                        Description

 

                                        No Information Available







Mental Status





                                        Description

 

                                        No Information Available







Referrals





                                        Description

 

                                        No Information Available

## 2021-10-16 NOTE — CCD
Continuity of Care Document (CCD)

                             Created on: 2021



Adal Casillas

External Reference #: [id number]

: 1950

Sex: Male



Demographics





                          Address                   [address] [place]  [address]

 

                          Home Phone                [phone]

 

                          Preferred Language        Unknown

 

                          Marital Status            Unknown

 

                          Scientology Affiliation     Unknown

 

                          Race                      White

 

                          Ethnic Group              Not  or 





Author





                          Author                    Adal PRICE PA

 

                          Organization              Unknown

 

                          Address                   [address] [place] [address] [address]

[place]  [address]



 

                          Phone                     [phone]







Care Team Providers





                    Care Team Member Name Role                Phone

 

                    Indio Gomez MD @ [facility]                [phone]







Problems





                                        Description

 

                                        No Information Available







Social History





                Type            Date            Description     Comments

 

                Birth Sex                       Unknown          

 

                ETOH Use                        Currently consumes alcohol CASE/

WK 

 

                Tobacco Use     Start: Unknown End: Unknown Patient is a former 

smoker 1/2 ppd x55 

years quit in 2018 

 

                Recreational Drug Use                 Regularly uses Marijuana  







Allergies, Adverse Reactions, Alerts





                                        Description

 

                                        No Known Drug Allergies







Medications





           Active Medications SIG        Qnty       Indications Ordering Provide

r Date

 

                          Allopurinol                     100mg Tablets         

          2 by mouth every

day                                             Unknown         

 

           Finasteride                     5mg Tablets                   daily  

                          Unknown    



 

                          Aspirin Low Dose                     81mg Tablets DR  

                 1 PO 

Daily                                           Unknown         

 

                          Vitamin B-12                     1000mcg Tablets      

             1 by mouth 

every day                                       Unknown         

 

                          Atorvastatin Calcium                     20mg Tablets 

                  one tab 

once daily                                      Unknown         







Immunizations





                                        Description

 

                                        No Information Available







Vital Signs





                Date            Vital           Result          Comment

 

                2021 10:55am BP Systolic     135 mmHg         

 

                    BP Diastolic        74 mmHg              

 

                    Body Temperature    98.4 F             

 

                    Height              66.5 inches         5'6.50"

 

                    Weight              171.38 lb            

 

                    BMI (Body Mass Index) 27.2 kg/m2           

 

                    Ideal Body Weight   142 lb               

 

                    Weight              77.736 kg            

 

                    BSA (Body Surface Area) 1.88 m2              

 

                2016 11:27am BP Systolic     146 mmHg         

 

                    BP Diastolic        84 mmHg              

 

                    Height              66.5 inches         5'6.50"

 

                    Weight              161.12 lb            

 

                    BMI (Body Mass Index) 25.6 kg/m2           

 

                    Ideal Body Weight   142 lb               

 

                    Weight              73.086 kg            

 

                    BSA (Body Surface Area) 1.83 m2              







Results





                                        Description

 

                                        No Information Available







Procedures





                                        Description

 

                                        No Information Available







Medical Devices





                                        Description

 

                                        No Information Available







Encounters





                                        Description

 

                                        No Information Available







Assessments





                                        Description

 

                                        No Information Available







Plan of Treatment





No Information Available



Functional Status





                                        Description

 

                                        No Information Available







Mental Status





                                        Description

 

                                        No Information Available







Referrals





                                        Description

 

                                        No Information Available

## 2021-10-16 NOTE — CCD
Continuity of Care Document (CCD)

                             Created on: 2021



Adal Casillas

External Reference #: [id number]

: 1950

Sex: Male



Demographics





                          Address                   [address] [place]  [address]

 

                          Home Phone                [phone]

 

                          Preferred Language        Unknown

 

                          Marital Status            Unknown

 

                          Alevism Affiliation     Unknown

 

                          Race                      White

 

                          Ethnic Group              Not  or 





Author





                          Author                    Adal PRICE PA

 

                          Organization              Unknown

 

                          Address                   [address]

[place]  [address]



 

                          Phone                     [phone]







Care Team Providers





                    Care Team Member Name Role                Phone

 

                    Indio Gomez MD @ [facility]                [phone]







Problems





                                        Description

 

                                        No Information Available







Social History





                Type            Date            Description     Comments

 

                Birth Sex                       Unknown          

 

                ETOH Use                        Currently consumes alcohol CASE/

WK 

 

                Tobacco Use     Start: Unknown End: Unknown Patient is a former 

smoker 1/2 ppd x55 

years quit in 2018 

 

                Recreational Drug Use                 Regularly uses Marijuana  







Allergies, Adverse Reactions, Alerts





                                        Description

 

                                        No Known Drug Allergies







Medications





           Active Medications SIG        Qnty       Indications Ordering Provide

r Date

 

                          Allopurinol                     100mg Tablets         

          2 by mouth every

day                                             Unknown         

 

           Finasteride                     5mg Tablets                   daily  

                          Unknown    



 

                          Aspirin Low Dose                     81mg Tablets DR  

                 1 PO 

Daily                                           Unknown         

 

                          Vitamin B-12                     1000mcg Tablets      

             1 by mouth 

every day                                       Unknown         

 

                          Atorvastatin Calcium                     20mg Tablets 

                  one tab 

once daily                                      Unknown         







Immunizations





                                        Description

 

                                        No Information Available







Vital Signs





                Date            Vital           Result          Comment

 

                2021 10:55am BP Systolic     135 mmHg         

 

                    BP Diastolic        74 mmHg              

 

                    Body Temperature    98.4 F             

 

                    Height              66.5 inches         5'6.50"

 

                    Weight              171.38 lb            

 

                    BMI (Body Mass Index) 27.2 kg/m2           

 

                    Ideal Body Weight   142 lb               

 

                    Weight              77.736 kg            

 

                    BSA (Body Surface Area) 1.88 m2              

 

                2016 11:27am BP Systolic     146 mmHg         

 

                    BP Diastolic        84 mmHg              

 

                    Height              66.5 inches         5'6.50"

 

                    Weight              161.12 lb            

 

                    BMI (Body Mass Index) 25.6 kg/m2           

 

                    Ideal Body Weight   142 lb               

 

                    Weight              73.086 kg            

 

                    BSA (Body Surface Area) 1.83 m2              







Results





                                        Description

 

                                        No Information Available







Procedures





                Date            Code            Description     Status

 

                2021      52778           Office/Outpatient New Moderate M

DM 45-59 Minutes Completed







[employer]





                                        Description

 

                                        No Information Available







Encounters





           Type       Date       Location   Provider   Dx         Diagnosis

 

             Office Visit 2021 10:30a [facility] KENIA Cabello 

Z86.010                                 Personal history of colonic polyps

 

                          R13.10                    Dysphagia, unspecified

 

                          K57.30                    Dvrtclos of lg int w/o perfo

ration or abscess w/o bleeding







Assessments





                Date            Code            Description     Provider

 

                2021      Z86.010         Personal history of colonic poly

ps KENIA Lancaster

 

                2021      R13.10          Dysphagia, unspecified KENIA Cabello

 

                    2021          K57.30              Diverticulosis of la

rge intestine without perforation or 

abscess without bleeding                KENIA Lancaster







Plan of Treatment

Future Appointment(s):* 2021  1:30 pm - KENIA Lancaster at [facility]

* 2021 11:45 am - Cade Pride M.D. at [facility]

2021 - KENIA Lancaster* Z86.010 Personal history of colonic polyps

* R13.10 Dysphagia, unspecified

* K57.30 Diverticulosis of large intestine without perforation or abscess 
  without bleeding





Functional Status





                                        Description

 

                                        No Information Available







Mental Status





                                        Description

 

                                        No Information Available







Referrals





                                        Description

 

                                        No Information Available

## 2021-10-16 NOTE — CCD
Continuity of Care Document (CCD)

                             Created on: 10/07/2021



Adal Casillas

External Reference #: [id number]

: 1950

Sex: Male



Demographics





                          Address                   [address] [place]  [address]

 

                          Home Phone                [phone]

 

                          Preferred Language        Unknown

 

                          Marital Status            Unknown

 

                          Mandaen Affiliation     Unknown

 

                          Race                      White

 

                          Ethnic Group              Not  or 





Author





                          Author                    Adal DOMINGUEZ M.D.

 

                          Organization              Unknown

 

                          Address                   [address], [address]

[place]  [address]



 

                          Phone                     [phone]







Care Team Providers





                    Care Team Member Name Role                Phone

 

                    Indio Gomez MD @ [facility]                [phone]







Problems





                                        Description

 

                                        No Information Available







Social History





                Type            Date            Description     Comments

 

                Birth Sex                       Unknown          

 

                ETOH Use                        Currently consumes alcohol CASE/

WK 

 

                Tobacco Use     Start: Unknown End: Unknown Patient is a former 

smoker 1/2 ppd x55 

years quit in 2018 

 

                Recreational Drug Use                 Regularly uses Marijuana  







Allergies and adverse reactions





                                        Description

 

                                        No Known Drug Allergies







Medications





           Active Medications SIG        Qnty       Indications Ordering Provide

r Date

 

                          Omeprazole                     40mg Capsules DR       

            1 by mouth 

daily           30caps          K21.00          Cade Dominguez M.D. 2021

 

                          Allopurinol                     100mg Tablets         

          2 by mouth every

day                                             Unknown         

 

           Finasteride                     5mg Tablets                   daily  

                          Unknown    



 

                          Aspirin Low Dose                     81mg Tablets DR  

                 1 PO 

Daily                                           Unknown         

 

                          Vitamin B-12                     1000mcg Tablets      

             1 by mouth 

every day                                       Unknown         

 

                          Atorvastatin Calcium                     20mg Tablets 

                  one tab 

once daily                                      Unknown         

 

                                        History Medications

 

                                        Suprep Bowel Prep Kit                   

  17.5-3.13-1.6GM/177ML Solution        

                take per doctor's bowel prep instructions. 354ml                

           Cade Dominguez M.D.                                    2021 - 2021







Immunizations





                                        Description

 

                                        No Information Available







Vital Signs





                Date            Vital           Result          Comment

 

                2021  1:30pm BP Systolic     148 mmHg         

 

                    BP Diastolic        76 mmHg              

 

                    Heart Rate          83 /min              

 

                    Body Temperature    98.3 F             

 

                    Height              66.5 inches         5'6.50"

 

                    Weight              172.00 lb            

 

                    BMI (Body Mass Index) 27.3 kg/m2           

 

                    Ideal Body Weight   142 lb               

 

                    Weight              78.019 kg            

 

                    BSA (Body Surface Area) 1.89 m2              

 

                2021 10:55am BP Systolic     135 mmHg         

 

                    BP Diastolic        74 mmHg              

 

                    Body Temperature    98.4 F             

 

                    Height              66.5 inches         5'6.50"

 

                    Weight              171.38 lb            

 

                    BMI (Body Mass Index) 27.2 kg/m2           

 

                    Ideal Body Weight   142 lb               

 

                    Weight              77.736 kg            

 

                    BSA (Body Surface Area) 1.88 m2              







Results





        Test    Acquired Date Facility Test    Result  H/L     Range   Note

 

                    Laboratory test finding 2021          [facility]

                                        [address] [place] [address]

           [phone] Pathology Request For Service (SEE NOTE)              

          1







                          1                         FINAL DIAGNOSIS



A-Gastric polyp, polypectomy:

Polypoid fragments of gastric mucosa with focal foveolar hyperplasia,

mild chronic inflammation and reactive changes.

No H.pylori is identified.



B-Colon polyps, polypectomy:

Tubular adenoma, fragments.

Separate fragments of hyperplastic polyp.

                                        2021 - 



CLINICAL DIAGNOSIS



H/O polyps, dysphagia

                                        2021 - 1450



GROSS DIAGNOSIS



A - Received in formalin labeled "gastric polyp" and consists of

fragment of tissue 0.3 x 0.2 x 0.1 cm.  All in one.



B - Received in formalin labeled "colon polyps" and consists of

fragments of tissue 0.5 x 0.5 x 0.2 cm. in aggregate.  All in one.

                                        -OA

                                        2021 - 



Signed________ RIKKI AMBRIZ MD 2021 [id number]









Procedures





                Date            Code            Description     Status

 

                2021      17241           Office/Outpatient Established Mo

d MDM 30-39 Min Completed

 

                2021      14876           Colonoscopy W/ Poly Completed

 

                2021      68886           Endoscopy Upper GI Remove Tumor/

Polyp/Lesion Snare Technique 

Completed

 

                2021      24016           Endoscopy Upper GI Balloon Dilat

ion Of Esophagus Completed

 

                2021      91541           Office/Outpatient New Moderate M

DM 45-59 Minutes Completed







Medical Devices





                                        Description

 

                                        No Information Available







Encounters





           Type       Date       Location   Provider   Dx         Diagnosis

 

             Office Visit 2021  1:30p [facility] KENIA Cabello 

D12.6                                   Benign neoplasm of colon, unspecified

 

                          K21.00                    Gastro-esophageal reflux dis

 with esophagitis, without bleed

 

                          K57.30                    Dvrtclos of lg int w/o perfo

ration or abscess w/o bleeding

 

             Office Visit 2021 10:30a [facility] KENIA Cabello 

Z86.010                                 Personal history of colonic polyps

 

                          R13.10                    Dysphagia, unspecified

 

                          K57.30                    Dvrtclos of lg int w/o perfo

ration or abscess w/o bleeding







Assessments





                Date            Code            Description     Provider

 

                2021      D12.6           Benign neoplasm of colon, unspec

ified KENIA Lancaster

 

                    2021          K21.00              Gastro-esophageal re

flux disease with esophagitis, without 

bleeding                                KENIA Lancaster

 

                    2021          K57.30              Diverticulosis of la

rge intestine without perforation or 

abscess without bleeding                KENIA Lancaster

 

                2021      Z12.11          Encounter for screening for vikash

gnant neoplasm of colon Cade Dominguez M.D.

 

                2021      Z86.010         Personal history of colonic poly

ps Cade Dominguez M.D.

 

                2021      D12.6           Benign neoplasm of colon, unspec

ified Cade Dominguez M.D.

 

                2021      K31.7           Polyp of stomach and duodenum Ro

ceci Dominguez M.D.

 

                    2021          K57.30              Diverticulosis of la

rge intestine without perforation or 

abscess without bleeding                Cade Dominguez M.D.

 

                2021      R13.10          Dysphagia, unspecified Cade barney M.D.

 

                2021      K22.2           Esophageal obstruction Cade barney M.D.

 

                2021      K31.89          Other diseases of stomach and du

odenum Cade Dominguez M.D.

 

                    2021          K21.00              Gastro-esophageal re

flux disease with esophagitis, without 

bleeding                                Cade Dominguez M.D.

 

                2021      Z86.010         Personal history of colonic poly

ps KENIA Lancaster

 

                2021      R13.10          Dysphagia, unspecified KENIA Cabello

 

                    2021          K57.30              Diverticulosis of la

rge intestine without perforation or 

abscess without bleeding                KENIA Lancaster







Plan of Treatment

2021 - KENIA Lancaster* D12.6 Benign neoplasm of colon, unspecified

* K21.00 Gastro-esophageal reflux disease with esophagitis, without bleeding* 
  New Medication:* Omeprazole 40 mg - 1 by mouth daily





* K57.30 Diverticulosis of large intestine without perforation or abscess 
  without bleeding





Functional Status





                                        Description

 

                                        No Information Available







Mental Status





                                        Description

 

                                        No Information Available







Referrals





                                        Description

 

                                        No Information Available

## 2021-10-16 NOTE — REP
INDICATION:

hypertension



COMPARISON:

None.



TECHNIQUE:

Portable AP view of the chest



FINDINGS:

The mediastinum and cardiac silhouette are within normal limits for portable

technique.  The lung fields are clear without acute consolidation, effusion, or

pneumothorax.  Skeletal structures are intact.



IMPRESSION:

No acute cardiopulmonary process appreciated.





<Electronically signed by Wilfredo Saha > 10/16/21 9937

## 2021-10-17 NOTE — ECGEPIP
[facility] - ED

                                       

                                       Test Date:    2021-10-16

Pat Name:     LINDA EPREIRA           Department:   

Patient ID:   [id number]                 Room:         -

Gender:       Male                     Technician:   KATEWILMER

:          1950               Requested By: AISHA COKER

Order Number: [id number]     Reading MD:   Sandro Holbrook

                                 Measurements

Intervals                              Axis          

Rate:         68                       P:            43

CA:           168                      QRS:          -22

QRSD:         64                       T:            31

QT:           374                                    

QTc:          397                                    

                           Interpretive Statements

Normal sinus rhythm

POOR R WAVE PROGRESSION

NONSPECIFIC T WAVE ABNORMALITY(S)

NO PRIORS FOR COMPARISON

Electronically Signed on 10- 19:21:19 EDT by Sandro Holbrook

## 2022-01-07 ENCOUNTER — HOSPITAL ENCOUNTER (OUTPATIENT)
Dept: HOSPITAL 53 - M LAB REF | Age: 72
End: 2022-01-07
Attending: INTERNAL MEDICINE
Payer: MEDICARE

## 2022-01-07 DIAGNOSIS — E79.0: Primary | ICD-10-CM

## 2022-12-29 ENCOUNTER — HOSPITAL ENCOUNTER (OUTPATIENT)
Dept: HOSPITAL 53 - M WUC | Age: 72
End: 2022-12-29
Attending: INTERNAL MEDICINE
Payer: MEDICARE

## 2022-12-29 DIAGNOSIS — M79.652: Primary | ICD-10-CM

## 2022-12-29 DIAGNOSIS — R22.42: ICD-10-CM

## 2022-12-29 DIAGNOSIS — M25.78: ICD-10-CM

## 2022-12-29 DIAGNOSIS — M43.16: ICD-10-CM

## 2022-12-29 DIAGNOSIS — M41.86: ICD-10-CM

## 2023-02-01 ENCOUNTER — HOSPITAL ENCOUNTER (OUTPATIENT)
Dept: HOSPITAL 53 - M RAD | Age: 73
End: 2023-02-01
Attending: INTERNAL MEDICINE
Payer: MEDICARE

## 2023-02-01 DIAGNOSIS — G45.9: Primary | ICD-10-CM

## 2023-02-28 ENCOUNTER — HOSPITAL ENCOUNTER (OUTPATIENT)
Dept: HOSPITAL 53 - M PLALAB | Age: 73
End: 2023-02-28
Attending: UROLOGY
Payer: MEDICARE

## 2023-02-28 DIAGNOSIS — R97.20: Primary | ICD-10-CM

## 2023-03-02 LAB — PSA SERPL-MCNC: 1.1 NG/ML (ref 0–4)

## 2023-03-17 ENCOUNTER — HOSPITAL ENCOUNTER (OUTPATIENT)
Dept: HOSPITAL 53 - M LABSMTC | Age: 73
End: 2023-03-17
Attending: ANESTHESIOLOGY
Payer: MEDICARE

## 2023-03-17 DIAGNOSIS — Z01.812: Primary | ICD-10-CM

## 2023-03-22 ENCOUNTER — HOSPITAL ENCOUNTER (INPATIENT)
Dept: HOSPITAL 53 - M SDC | Age: 73
LOS: 3 days | Discharge: HOME | DRG: 310 | End: 2023-03-25
Attending: INTERNAL MEDICINE | Admitting: INTERNAL MEDICINE
Payer: MEDICARE

## 2023-03-22 VITALS — SYSTOLIC BLOOD PRESSURE: 138 MMHG | DIASTOLIC BLOOD PRESSURE: 83 MMHG

## 2023-03-22 VITALS — WEIGHT: 169.54 LBS | HEIGHT: 66 IN | BODY MASS INDEX: 27.25 KG/M2

## 2023-03-22 VITALS — DIASTOLIC BLOOD PRESSURE: 79 MMHG | SYSTOLIC BLOOD PRESSURE: 129 MMHG

## 2023-03-22 VITALS — DIASTOLIC BLOOD PRESSURE: 60 MMHG | SYSTOLIC BLOOD PRESSURE: 114 MMHG

## 2023-03-22 VITALS — DIASTOLIC BLOOD PRESSURE: 83 MMHG | SYSTOLIC BLOOD PRESSURE: 138 MMHG

## 2023-03-22 DIAGNOSIS — N40.0: ICD-10-CM

## 2023-03-22 DIAGNOSIS — I48.91: Primary | ICD-10-CM

## 2023-03-22 DIAGNOSIS — I10: ICD-10-CM

## 2023-03-22 DIAGNOSIS — M10.9: ICD-10-CM

## 2023-03-22 DIAGNOSIS — E78.5: ICD-10-CM

## 2023-03-22 DIAGNOSIS — F10.10: ICD-10-CM

## 2023-03-22 DIAGNOSIS — Z90.49: ICD-10-CM

## 2023-03-22 DIAGNOSIS — K21.9: ICD-10-CM

## 2023-03-22 DIAGNOSIS — Z79.84: ICD-10-CM

## 2023-03-22 DIAGNOSIS — Z53.09: ICD-10-CM

## 2023-03-22 DIAGNOSIS — Z79.899: ICD-10-CM

## 2023-03-22 LAB
ALBUMIN SERPL BCG-MCNC: 3.8 G/DL (ref 3.2–5.2)
ALP SERPL-CCNC: 74 U/L (ref 46–116)
ALT SERPL W P-5'-P-CCNC: 16 U/L (ref 7–40)
AST SERPL-CCNC: 19 U/L (ref ?–34)
BILIRUB SERPL-MCNC: 0.6 MG/DL (ref 0.3–1.2)
BUN SERPL-MCNC: 25 MG/DL (ref 9–23)
CALCIUM SERPL-MCNC: 9.5 MG/DL (ref 8.3–10.6)
CHLORIDE SERPL-SCNC: 106 MMOL/L (ref 98–107)
CK MB CFR.DF SERPL CALC: 1.29
CK MB CFR.DF SERPL CALC: 1.49
CK MB CFR.DF SERPL CALC: 1.56
CK MB SERPL-MCNC: 1 NG/ML (ref ?–3.6)
CK MB SERPL-MCNC: 1 NG/ML (ref ?–3.6)
CK MB SERPL-MCNC: < 1 NG/ML (ref ?–3.6)
CK SERPL-CCNC: 64 U/L (ref 46–171)
CK SERPL-CCNC: 67 U/L (ref 46–171)
CK SERPL-CCNC: 77 U/L (ref 46–171)
CO2 SERPL-SCNC: 22 MMOL/L (ref 20–31)
CREAT SERPL-MCNC: 0.86 MG/DL (ref 0.7–1.3)
GFR SERPL CREATININE-BSD FRML MDRD: > 60 ML/MIN/{1.73_M2} (ref 42–?)
GLUCOSE SERPL-MCNC: 105 MG/DL (ref 74–106)
HCT VFR BLD AUTO: 47.9 % (ref 42–52)
HGB BLD-MCNC: 15.5 G/DL (ref 13.5–17.5)
INR PPP: 0.91
MAGNESIUM SERPL-MCNC: 1.9 MG/DL (ref 1.8–2.4)
MCH RBC QN AUTO: 30.3 PG (ref 27–33)
MCHC RBC AUTO-ENTMCNC: 32.4 G/DL (ref 32–36.5)
MCV RBC AUTO: 93.6 FL (ref 80–96)
PLATELET # BLD AUTO: 327 10^3/UL (ref 150–450)
POTASSIUM SERPL-SCNC: 4.5 MMOL/L (ref 3.5–5.1)
PROT SERPL-MCNC: 6.8 G/DL (ref 5.7–8.2)
PROTHROMBIN TIME: 12.5 SECONDS (ref 12.5–14.5)
RBC # BLD AUTO: 5.12 10^6/UL (ref 4.3–6.1)
SODIUM SERPL-SCNC: 140 MMOL/L (ref 136–145)
T4 FREE SERPL-MCNC: 1.11 NG/DL (ref 0.89–1.76)
TSH SERPL DL<=0.005 MIU/L-ACNC: 2.07 UIU/ML (ref 0.55–4.78)
WBC # BLD AUTO: 12.1 10^3/UL (ref 4–10)

## 2023-03-22 PROCEDURE — B246ZZZ ULTRASONOGRAPHY OF RIGHT AND LEFT HEART: ICD-10-PCS | Performed by: INTERNAL MEDICINE

## 2023-03-22 RX ADMIN — Medication SCH MG: at 21:05

## 2023-03-22 RX ADMIN — ENOXAPARIN SODIUM SCH MG: 80 INJECTION SUBCUTANEOUS at 21:05

## 2023-03-22 RX ADMIN — FOLIC ACID SCH MG: 1 TABLET ORAL at 14:35

## 2023-03-22 RX ADMIN — METOPROLOL TARTRATE SCH MG: 25 TABLET, FILM COATED ORAL at 14:33

## 2023-03-22 RX ADMIN — METOPROLOL TARTRATE SCH MG: 25 TABLET, FILM COATED ORAL at 23:29

## 2023-03-22 RX ADMIN — MULTIPLE VITAMINS W/ MINERALS TAB SCH TAB: TAB at 14:35

## 2023-03-22 RX ADMIN — METOPROLOL TARTRATE SCH MG: 25 TABLET, FILM COATED ORAL at 17:49

## 2023-03-22 RX ADMIN — DOCUSATE SODIUM SCH MG: 100 CAPSULE, LIQUID FILLED ORAL at 21:05

## 2023-03-23 VITALS — SYSTOLIC BLOOD PRESSURE: 137 MMHG | DIASTOLIC BLOOD PRESSURE: 78 MMHG

## 2023-03-23 VITALS — SYSTOLIC BLOOD PRESSURE: 129 MMHG | DIASTOLIC BLOOD PRESSURE: 81 MMHG

## 2023-03-23 VITALS — DIASTOLIC BLOOD PRESSURE: 74 MMHG | SYSTOLIC BLOOD PRESSURE: 122 MMHG

## 2023-03-23 VITALS — DIASTOLIC BLOOD PRESSURE: 79 MMHG | SYSTOLIC BLOOD PRESSURE: 128 MMHG

## 2023-03-23 VITALS — DIASTOLIC BLOOD PRESSURE: 68 MMHG | SYSTOLIC BLOOD PRESSURE: 104 MMHG

## 2023-03-23 VITALS — SYSTOLIC BLOOD PRESSURE: 138 MMHG | DIASTOLIC BLOOD PRESSURE: 78 MMHG

## 2023-03-23 VITALS — DIASTOLIC BLOOD PRESSURE: 81 MMHG | SYSTOLIC BLOOD PRESSURE: 129 MMHG

## 2023-03-23 VITALS — SYSTOLIC BLOOD PRESSURE: 119 MMHG | DIASTOLIC BLOOD PRESSURE: 70 MMHG

## 2023-03-23 VITALS — DIASTOLIC BLOOD PRESSURE: 85 MMHG | SYSTOLIC BLOOD PRESSURE: 136 MMHG

## 2023-03-23 VITALS — DIASTOLIC BLOOD PRESSURE: 74 MMHG | SYSTOLIC BLOOD PRESSURE: 124 MMHG

## 2023-03-23 LAB
BASOPHILS # BLD AUTO: 0 10^3/UL (ref 0–0.2)
BASOPHILS NFR BLD AUTO: 0.4 % (ref 0–1)
BUN SERPL-MCNC: 25 MG/DL (ref 9–23)
CALCIUM SERPL-MCNC: 9.3 MG/DL (ref 8.3–10.6)
CHLORIDE SERPL-SCNC: 105 MMOL/L (ref 98–107)
CO2 SERPL-SCNC: 25 MMOL/L (ref 20–31)
CREAT SERPL-MCNC: 0.94 MG/DL (ref 0.7–1.3)
EOSINOPHIL # BLD AUTO: 0.2 10^3/UL (ref 0–0.5)
EOSINOPHIL NFR BLD AUTO: 2.3 % (ref 0–3)
GFR SERPL CREATININE-BSD FRML MDRD: > 60 ML/MIN/{1.73_M2} (ref 42–?)
GLUCOSE SERPL-MCNC: 112 MG/DL (ref 74–106)
HCT VFR BLD AUTO: 48.6 % (ref 42–52)
HGB BLD-MCNC: 15.8 G/DL (ref 13.5–17.5)
LYMPHOCYTES # BLD AUTO: 2.5 10^3/UL (ref 1.5–5)
LYMPHOCYTES NFR BLD AUTO: 25 % (ref 24–44)
MAGNESIUM SERPL-MCNC: 1.9 MG/DL (ref 1.8–2.4)
MCH RBC QN AUTO: 30.4 PG (ref 27–33)
MCHC RBC AUTO-ENTMCNC: 32.5 G/DL (ref 32–36.5)
MCV RBC AUTO: 93.6 FL (ref 80–96)
MONOCYTES # BLD AUTO: 1.5 10^3/UL (ref 0–0.8)
MONOCYTES NFR BLD AUTO: 14.8 % (ref 2–8)
NEUTROPHILS # BLD AUTO: 5.8 10^3/UL (ref 1.5–8.5)
NEUTROPHILS NFR BLD AUTO: 56.8 % (ref 36–66)
PLATELET # BLD AUTO: 355 10^3/UL (ref 150–450)
POTASSIUM SERPL-SCNC: 4.6 MMOL/L (ref 3.5–5.1)
RBC # BLD AUTO: 5.19 10^6/UL (ref 4.3–6.1)
SODIUM SERPL-SCNC: 139 MMOL/L (ref 136–145)
WBC # BLD AUTO: 10.1 10^3/UL (ref 4–10)

## 2023-03-23 RX ADMIN — DOCUSATE SODIUM SCH MG: 100 CAPSULE, LIQUID FILLED ORAL at 21:00

## 2023-03-23 RX ADMIN — ATORVASTATIN CALCIUM SCH MG: 20 TABLET, FILM COATED ORAL at 08:35

## 2023-03-23 RX ADMIN — FOLIC ACID SCH MG: 1 TABLET ORAL at 08:31

## 2023-03-23 RX ADMIN — Medication SCH MG: at 08:33

## 2023-03-23 RX ADMIN — Medication SCH UNITS: at 08:30

## 2023-03-23 RX ADMIN — Medication SCH MG: at 20:53

## 2023-03-23 RX ADMIN — OMEGA-3 FATTY ACIDS CAP 1000 MG SCH CAP: 1000 CAP at 08:30

## 2023-03-23 RX ADMIN — ENOXAPARIN SODIUM SCH MG: 80 INJECTION SUBCUTANEOUS at 08:35

## 2023-03-23 RX ADMIN — OMEPRAZOLE SCH MG: 20 CAPSULE, DELAYED RELEASE ORAL at 08:32

## 2023-03-23 RX ADMIN — LOSARTAN POTASSIUM SCH MG: 50 TABLET, FILM COATED ORAL at 08:31

## 2023-03-23 RX ADMIN — ASPIRIN SCH MG: 81 TABLET ORAL at 08:30

## 2023-03-23 RX ADMIN — DOCUSATE SODIUM SCH MG: 100 CAPSULE, LIQUID FILLED ORAL at 08:36

## 2023-03-23 RX ADMIN — METOPROLOL TARTRATE SCH MG: 25 TABLET, FILM COATED ORAL at 23:56

## 2023-03-23 RX ADMIN — MULTIPLE VITAMINS W/ MINERALS TAB SCH TAB: TAB at 08:32

## 2023-03-23 RX ADMIN — DOCUSATE SODIUM SCH MG: 100 CAPSULE, LIQUID FILLED ORAL at 08:33

## 2023-03-23 RX ADMIN — METOPROLOL TARTRATE SCH MG: 25 TABLET, FILM COATED ORAL at 17:29

## 2023-03-23 RX ADMIN — ENOXAPARIN SODIUM SCH MG: 80 INJECTION SUBCUTANEOUS at 20:53

## 2023-03-23 RX ADMIN — FINASTERIDE SCH MG: 5 TABLET, FILM COATED ORAL at 08:32

## 2023-03-23 RX ADMIN — METOPROLOL TARTRATE SCH MG: 25 TABLET, FILM COATED ORAL at 05:42

## 2023-03-23 RX ADMIN — METOPROLOL TARTRATE SCH MG: 25 TABLET, FILM COATED ORAL at 14:16

## 2023-03-24 VITALS — DIASTOLIC BLOOD PRESSURE: 92 MMHG | SYSTOLIC BLOOD PRESSURE: 144 MMHG

## 2023-03-24 VITALS — DIASTOLIC BLOOD PRESSURE: 68 MMHG | SYSTOLIC BLOOD PRESSURE: 112 MMHG

## 2023-03-24 VITALS — DIASTOLIC BLOOD PRESSURE: 64 MMHG | SYSTOLIC BLOOD PRESSURE: 99 MMHG

## 2023-03-24 VITALS — SYSTOLIC BLOOD PRESSURE: 104 MMHG | DIASTOLIC BLOOD PRESSURE: 68 MMHG

## 2023-03-24 VITALS — DIASTOLIC BLOOD PRESSURE: 70 MMHG | SYSTOLIC BLOOD PRESSURE: 152 MMHG

## 2023-03-24 VITALS — SYSTOLIC BLOOD PRESSURE: 144 MMHG | DIASTOLIC BLOOD PRESSURE: 92 MMHG

## 2023-03-24 VITALS — SYSTOLIC BLOOD PRESSURE: 100 MMHG | DIASTOLIC BLOOD PRESSURE: 74 MMHG

## 2023-03-24 VITALS — DIASTOLIC BLOOD PRESSURE: 80 MMHG | SYSTOLIC BLOOD PRESSURE: 144 MMHG

## 2023-03-24 VITALS — DIASTOLIC BLOOD PRESSURE: 67 MMHG | SYSTOLIC BLOOD PRESSURE: 138 MMHG

## 2023-03-24 VITALS — SYSTOLIC BLOOD PRESSURE: 152 MMHG | DIASTOLIC BLOOD PRESSURE: 70 MMHG

## 2023-03-24 VITALS — SYSTOLIC BLOOD PRESSURE: 141 MMHG | DIASTOLIC BLOOD PRESSURE: 67 MMHG

## 2023-03-24 VITALS — SYSTOLIC BLOOD PRESSURE: 120 MMHG | DIASTOLIC BLOOD PRESSURE: 66 MMHG

## 2023-03-24 LAB
BASOPHILS # BLD AUTO: 0.1 10^3/UL (ref 0–0.2)
BASOPHILS NFR BLD AUTO: 0.6 % (ref 0–1)
BUN SERPL-MCNC: 26 MG/DL (ref 9–23)
CALCIUM SERPL-MCNC: 9.2 MG/DL (ref 8.3–10.6)
CHLORIDE SERPL-SCNC: 106 MMOL/L (ref 98–107)
CO2 SERPL-SCNC: 22 MMOL/L (ref 20–31)
CREAT SERPL-MCNC: 0.92 MG/DL (ref 0.7–1.3)
EOSINOPHIL # BLD AUTO: 0.3 10^3/UL (ref 0–0.5)
EOSINOPHIL NFR BLD AUTO: 3 % (ref 0–3)
GFR SERPL CREATININE-BSD FRML MDRD: > 60 ML/MIN/{1.73_M2} (ref 42–?)
GLUCOSE SERPL-MCNC: 103 MG/DL (ref 74–106)
HCT VFR BLD AUTO: 47.6 % (ref 42–52)
HGB BLD-MCNC: 15.5 G/DL (ref 13.5–17.5)
LYMPHOCYTES # BLD AUTO: 2.5 10^3/UL (ref 1.5–5)
LYMPHOCYTES NFR BLD AUTO: 28.5 % (ref 24–44)
MAGNESIUM SERPL-MCNC: 2 MG/DL (ref 1.8–2.4)
MCH RBC QN AUTO: 30.7 PG (ref 27–33)
MCHC RBC AUTO-ENTMCNC: 32.6 G/DL (ref 32–36.5)
MCV RBC AUTO: 94.3 FL (ref 80–96)
MONOCYTES # BLD AUTO: 1.2 10^3/UL (ref 0–0.8)
MONOCYTES NFR BLD AUTO: 13.2 % (ref 2–8)
NEUTROPHILS # BLD AUTO: 4.7 10^3/UL (ref 1.5–8.5)
NEUTROPHILS NFR BLD AUTO: 54.2 % (ref 36–66)
PLATELET # BLD AUTO: 289 10^3/UL (ref 150–450)
POTASSIUM SERPL-SCNC: 4.1 MMOL/L (ref 3.5–5.1)
RBC # BLD AUTO: 5.05 10^6/UL (ref 4.3–6.1)
SODIUM SERPL-SCNC: 139 MMOL/L (ref 136–145)
WBC # BLD AUTO: 8.7 10^3/UL (ref 4–10)

## 2023-03-24 RX ADMIN — MULTIPLE VITAMINS W/ MINERALS TAB SCH TAB: TAB at 08:20

## 2023-03-24 RX ADMIN — Medication SCH MG: at 20:03

## 2023-03-24 RX ADMIN — METOPROLOL TARTRATE SCH MG: 50 TABLET, FILM COATED ORAL at 11:09

## 2023-03-24 RX ADMIN — Medication SCH MG: at 08:20

## 2023-03-24 RX ADMIN — METOPROLOL TARTRATE SCH MG: 50 TABLET, FILM COATED ORAL at 17:27

## 2023-03-24 RX ADMIN — ASPIRIN SCH MG: 81 TABLET ORAL at 08:20

## 2023-03-24 RX ADMIN — Medication SCH UNITS: at 08:21

## 2023-03-24 RX ADMIN — ENOXAPARIN SODIUM SCH MG: 80 INJECTION SUBCUTANEOUS at 20:03

## 2023-03-24 RX ADMIN — FINASTERIDE SCH MG: 5 TABLET, FILM COATED ORAL at 08:20

## 2023-03-24 RX ADMIN — METOPROLOL TARTRATE SCH MG: 50 TABLET, FILM COATED ORAL at 23:43

## 2023-03-24 RX ADMIN — FOLIC ACID SCH MG: 1 TABLET ORAL at 08:21

## 2023-03-24 RX ADMIN — DOCUSATE SODIUM SCH MG: 100 CAPSULE, LIQUID FILLED ORAL at 20:03

## 2023-03-24 RX ADMIN — ATORVASTATIN CALCIUM SCH MG: 20 TABLET, FILM COATED ORAL at 08:21

## 2023-03-24 RX ADMIN — OMEGA-3 FATTY ACIDS CAP 1000 MG SCH CAP: 1000 CAP at 08:20

## 2023-03-24 RX ADMIN — LOSARTAN POTASSIUM SCH MG: 50 TABLET, FILM COATED ORAL at 08:21

## 2023-03-24 RX ADMIN — DOCUSATE SODIUM SCH MG: 100 CAPSULE, LIQUID FILLED ORAL at 08:21

## 2023-03-24 RX ADMIN — OMEPRAZOLE SCH MG: 20 CAPSULE, DELAYED RELEASE ORAL at 08:20

## 2023-03-24 RX ADMIN — METOPROLOL TARTRATE SCH MG: 25 TABLET, FILM COATED ORAL at 05:19

## 2023-03-24 RX ADMIN — ENOXAPARIN SODIUM SCH MG: 80 INJECTION SUBCUTANEOUS at 08:22

## 2023-03-25 VITALS — DIASTOLIC BLOOD PRESSURE: 79 MMHG | SYSTOLIC BLOOD PRESSURE: 123 MMHG

## 2023-03-25 VITALS — SYSTOLIC BLOOD PRESSURE: 117 MMHG | DIASTOLIC BLOOD PRESSURE: 63 MMHG

## 2023-03-25 VITALS — SYSTOLIC BLOOD PRESSURE: 110 MMHG | DIASTOLIC BLOOD PRESSURE: 70 MMHG

## 2023-03-25 LAB
BASOPHILS # BLD AUTO: 0.1 10^3/UL (ref 0–0.2)
BASOPHILS NFR BLD AUTO: 0.5 % (ref 0–1)
BUN SERPL-MCNC: 31 MG/DL (ref 9–23)
CALCIUM SERPL-MCNC: 9.2 MG/DL (ref 8.3–10.6)
CHLORIDE SERPL-SCNC: 107 MMOL/L (ref 98–107)
CO2 SERPL-SCNC: 24 MMOL/L (ref 20–31)
CREAT SERPL-MCNC: 1.12 MG/DL (ref 0.7–1.3)
EOSINOPHIL # BLD AUTO: 0.3 10^3/UL (ref 0–0.5)
EOSINOPHIL NFR BLD AUTO: 2.5 % (ref 0–3)
GFR SERPL CREATININE-BSD FRML MDRD: > 60 ML/MIN/{1.73_M2} (ref 42–?)
GLUCOSE SERPL-MCNC: 101 MG/DL (ref 74–106)
HCT VFR BLD AUTO: 46.1 % (ref 42–52)
HGB BLD-MCNC: 15.2 G/DL (ref 13.5–17.5)
LYMPHOCYTES # BLD AUTO: 2.8 10^3/UL (ref 1.5–5)
LYMPHOCYTES NFR BLD AUTO: 26.2 % (ref 24–44)
MAGNESIUM SERPL-MCNC: 2 MG/DL (ref 1.8–2.4)
MCH RBC QN AUTO: 31.1 PG (ref 27–33)
MCHC RBC AUTO-ENTMCNC: 33 G/DL (ref 32–36.5)
MCV RBC AUTO: 94.5 FL (ref 80–96)
MONOCYTES # BLD AUTO: 1.4 10^3/UL (ref 0–0.8)
MONOCYTES NFR BLD AUTO: 13.1 % (ref 2–8)
NEUTROPHILS # BLD AUTO: 6 10^3/UL (ref 1.5–8.5)
NEUTROPHILS NFR BLD AUTO: 57 % (ref 36–66)
PLATELET # BLD AUTO: 311 10^3/UL (ref 150–450)
POTASSIUM SERPL-SCNC: 4.1 MMOL/L (ref 3.5–5.1)
RBC # BLD AUTO: 4.88 10^6/UL (ref 4.3–6.1)
SODIUM SERPL-SCNC: 140 MMOL/L (ref 136–145)
WBC # BLD AUTO: 10.5 10^3/UL (ref 4–10)

## 2023-03-25 RX ADMIN — METOPROLOL TARTRATE SCH MG: 50 TABLET, FILM COATED ORAL at 05:06

## 2023-03-25 RX ADMIN — OMEGA-3 FATTY ACIDS CAP 1000 MG SCH CAP: 1000 CAP at 08:43

## 2023-03-25 RX ADMIN — ENOXAPARIN SODIUM SCH MG: 80 INJECTION SUBCUTANEOUS at 08:42

## 2023-03-25 RX ADMIN — Medication SCH UNITS: at 08:42

## 2023-03-25 RX ADMIN — OMEPRAZOLE SCH MG: 20 CAPSULE, DELAYED RELEASE ORAL at 08:43

## 2023-03-25 RX ADMIN — MULTIPLE VITAMINS W/ MINERALS TAB SCH TAB: TAB at 08:44

## 2023-03-25 RX ADMIN — Medication SCH MG: at 08:43

## 2023-03-25 RX ADMIN — ASPIRIN SCH MG: 81 TABLET ORAL at 08:42

## 2023-03-25 RX ADMIN — DOCUSATE SODIUM SCH MG: 100 CAPSULE, LIQUID FILLED ORAL at 08:43

## 2023-03-25 RX ADMIN — FINASTERIDE SCH MG: 5 TABLET, FILM COATED ORAL at 08:44

## 2023-03-25 RX ADMIN — ATORVASTATIN CALCIUM SCH MG: 20 TABLET, FILM COATED ORAL at 08:43

## 2023-03-25 RX ADMIN — FOLIC ACID SCH MG: 1 TABLET ORAL at 08:43

## 2023-03-25 RX ADMIN — LOSARTAN POTASSIUM SCH MG: 50 TABLET, FILM COATED ORAL at 08:42

## 2023-06-14 ENCOUNTER — HOSPITAL ENCOUNTER (OUTPATIENT)
Dept: HOSPITAL 53 - M SDC | Age: 73
Discharge: HOME | End: 2023-06-14
Attending: OPHTHALMOLOGY
Payer: MEDICARE

## 2023-06-14 VITALS — DIASTOLIC BLOOD PRESSURE: 89 MMHG | TEMPERATURE: 96.9 F | SYSTOLIC BLOOD PRESSURE: 175 MMHG | OXYGEN SATURATION: 70 %

## 2023-06-14 VITALS — BODY MASS INDEX: 28.22 KG/M2 | HEIGHT: 66 IN | WEIGHT: 175.6 LBS

## 2023-06-14 DIAGNOSIS — Z79.01: ICD-10-CM

## 2023-06-14 DIAGNOSIS — I10: ICD-10-CM

## 2023-06-14 DIAGNOSIS — K21.9: ICD-10-CM

## 2023-06-14 DIAGNOSIS — N40.0: ICD-10-CM

## 2023-06-14 DIAGNOSIS — Z79.899: ICD-10-CM

## 2023-06-14 DIAGNOSIS — Z87.891: ICD-10-CM

## 2023-06-14 DIAGNOSIS — I48.91: ICD-10-CM

## 2023-06-14 DIAGNOSIS — H25.11: Primary | ICD-10-CM

## 2023-06-14 DIAGNOSIS — E78.5: ICD-10-CM

## 2023-06-14 DIAGNOSIS — M10.9: ICD-10-CM

## 2023-06-14 PROCEDURE — 66984 XCAPSL CTRC RMVL W/O ECP: CPT

## 2023-08-09 ENCOUNTER — HOSPITAL ENCOUNTER (OUTPATIENT)
Dept: HOSPITAL 53 - M SDC | Age: 73
Discharge: HOME | End: 2023-08-09
Attending: OPHTHALMOLOGY
Payer: MEDICARE

## 2023-08-09 VITALS — TEMPERATURE: 97.4 F | OXYGEN SATURATION: 95 % | DIASTOLIC BLOOD PRESSURE: 75 MMHG | SYSTOLIC BLOOD PRESSURE: 141 MMHG

## 2023-08-09 VITALS — HEIGHT: 66 IN | WEIGHT: 167 LBS | BODY MASS INDEX: 26.84 KG/M2

## 2023-08-09 DIAGNOSIS — I10: ICD-10-CM

## 2023-08-09 DIAGNOSIS — I48.91: ICD-10-CM

## 2023-08-09 DIAGNOSIS — Z79.01: ICD-10-CM

## 2023-08-09 DIAGNOSIS — K21.9: ICD-10-CM

## 2023-08-09 DIAGNOSIS — E78.5: ICD-10-CM

## 2023-08-09 DIAGNOSIS — Z79.899: ICD-10-CM

## 2023-08-09 DIAGNOSIS — H25.12: Primary | ICD-10-CM

## 2023-08-09 PROCEDURE — 66984 XCAPSL CTRC RMVL W/O ECP: CPT

## 2023-10-25 ENCOUNTER — HOSPITAL ENCOUNTER (OUTPATIENT)
Dept: HOSPITAL 53 - M SDC | Age: 73
Discharge: HOME | End: 2023-10-25
Attending: INTERNAL MEDICINE
Payer: MEDICARE

## 2023-10-25 VITALS — TEMPERATURE: 96.3 F | OXYGEN SATURATION: 94 % | DIASTOLIC BLOOD PRESSURE: 77 MMHG | SYSTOLIC BLOOD PRESSURE: 146 MMHG

## 2023-10-25 VITALS — BODY MASS INDEX: 28.96 KG/M2 | HEIGHT: 66 IN | WEIGHT: 180.2 LBS

## 2023-10-25 DIAGNOSIS — K21.9: ICD-10-CM

## 2023-10-25 DIAGNOSIS — I48.19: Primary | ICD-10-CM

## 2023-10-25 DIAGNOSIS — N40.0: ICD-10-CM

## 2023-10-25 DIAGNOSIS — M10.9: ICD-10-CM

## 2023-10-25 DIAGNOSIS — Z87.891: ICD-10-CM

## 2023-10-25 DIAGNOSIS — I10: ICD-10-CM

## 2023-10-25 DIAGNOSIS — E78.5: ICD-10-CM

## 2023-10-25 DIAGNOSIS — Z79.899: ICD-10-CM

## 2023-10-25 DIAGNOSIS — F12.10: ICD-10-CM

## 2023-10-25 DIAGNOSIS — Z79.01: ICD-10-CM

## 2023-10-29 ENCOUNTER — HOSPITAL ENCOUNTER (EMERGENCY)
Dept: HOSPITAL 53 - M ED | Age: 73
Discharge: HOME | End: 2023-10-29
Payer: MEDICARE

## 2023-10-29 VITALS — SYSTOLIC BLOOD PRESSURE: 159 MMHG | DIASTOLIC BLOOD PRESSURE: 89 MMHG

## 2023-10-29 VITALS — BODY MASS INDEX: 28.77 KG/M2 | WEIGHT: 179.02 LBS | HEIGHT: 66 IN

## 2023-10-29 VITALS — TEMPERATURE: 97.1 F | SYSTOLIC BLOOD PRESSURE: 143 MMHG | OXYGEN SATURATION: 97 % | DIASTOLIC BLOOD PRESSURE: 95 MMHG

## 2023-10-29 DIAGNOSIS — I44.4: ICD-10-CM

## 2023-10-29 DIAGNOSIS — I25.2: ICD-10-CM

## 2023-10-29 DIAGNOSIS — I10: ICD-10-CM

## 2023-10-29 DIAGNOSIS — F10.10: ICD-10-CM

## 2023-10-29 DIAGNOSIS — Z86.79: ICD-10-CM

## 2023-10-29 DIAGNOSIS — I48.91: Primary | ICD-10-CM

## 2023-10-29 DIAGNOSIS — Z87.891: ICD-10-CM

## 2023-10-29 DIAGNOSIS — E78.5: ICD-10-CM

## 2023-10-29 LAB
ALBUMIN SERPL BCG-MCNC: 4.5 G/DL (ref 3.2–5.2)
ALP SERPL-CCNC: 73 U/L (ref 46–116)
ALT SERPL W P-5'-P-CCNC: 38 U/L (ref 7–40)
APTT BLD: 31.7 SECONDS (ref 24.8–34.2)
AST SERPL-CCNC: 28 U/L (ref ?–34)
BASOPHILS # BLD AUTO: 0 10^3/UL (ref 0–0.2)
BASOPHILS NFR BLD AUTO: 0.3 % (ref 0–1)
BILIRUB CONJ SERPL-MCNC: 0.2 MG/DL (ref ?–0.4)
BILIRUB SERPL-MCNC: 0.6 MG/DL (ref 0.3–1.2)
BUN SERPL-MCNC: 23 MG/DL (ref 9–23)
CALCIUM SERPL-MCNC: 9.6 MG/DL (ref 8.3–10.6)
CHLORIDE SERPL-SCNC: 104 MMOL/L (ref 98–107)
CK MB CFR.DF SERPL CALC: 1.26
CK MB CFR.DF SERPL CALC: 2
CK MB SERPL-MCNC: 1.6 NG/ML (ref ?–3.6)
CK MB SERPL-MCNC: 2.2 NG/ML (ref ?–3.6)
CK SERPL-CCNC: 110 U/L (ref 46–171)
CK SERPL-CCNC: 126 U/L (ref 46–171)
CO2 SERPL-SCNC: 24 MMOL/L (ref 20–31)
CREAT SERPL-MCNC: 1.22 MG/DL (ref 0.7–1.3)
EOSINOPHIL # BLD AUTO: 0.1 10^3/UL (ref 0–0.5)
EOSINOPHIL NFR BLD AUTO: 1 % (ref 0–3)
ETHANOL SERPL-MCNC: < 0.003 % (ref 0–0.01)
GFR SERPL CREATININE-BSD FRML MDRD: > 60 ML/MIN/{1.73_M2} (ref 42–?)
GLUCOSE SERPL-MCNC: 109 MG/DL (ref 74–106)
HCT VFR BLD AUTO: 49.9 % (ref 42–52)
HGB BLD-MCNC: 16.5 G/DL (ref 13.5–17.5)
INR PPP: 1.28
LIPASE SERPL-CCNC: 63 U/L (ref 12–53)
LYMPHOCYTES # BLD AUTO: 1.9 10^3/UL (ref 1.5–5)
LYMPHOCYTES NFR BLD AUTO: 17 % (ref 24–44)
MCH RBC QN AUTO: 31.5 PG (ref 27–33)
MCHC RBC AUTO-ENTMCNC: 33.1 G/DL (ref 32–36.5)
MCV RBC AUTO: 95.4 FL (ref 80–96)
MONOCYTES # BLD AUTO: 1.6 10^3/UL (ref 0–0.8)
MONOCYTES NFR BLD AUTO: 14.2 % (ref 2–8)
NEUTROPHILS # BLD AUTO: 7.4 10^3/UL (ref 1.5–8.5)
NEUTROPHILS NFR BLD AUTO: 67 % (ref 36–66)
PLATELET # BLD AUTO: 254 10^3/UL (ref 150–450)
POTASSIUM SERPL-SCNC: 4.9 MMOL/L (ref 3.5–5.1)
PROT SERPL-MCNC: 7.5 G/DL (ref 5.7–8.2)
PROTHROMBIN TIME: 15.6 SECONDS (ref 12.5–14.5)
RBC # BLD AUTO: 5.23 10^6/UL (ref 4.3–6.1)
RSV RNA NPH QL NAA+PROBE: NEGATIVE
SODIUM SERPL-SCNC: 139 MMOL/L (ref 136–145)
TSH SERPL DL<=0.005 MIU/L-ACNC: 6.15 UIU/ML (ref 0.55–4.78)
WBC # BLD AUTO: 11 10^3/UL (ref 4–10)

## 2024-02-09 ENCOUNTER — HOSPITAL ENCOUNTER (OUTPATIENT)
Dept: HOSPITAL 53 - M RAD | Age: 74
End: 2024-02-09
Attending: INTERNAL MEDICINE
Payer: MEDICARE

## 2024-02-09 DIAGNOSIS — K76.89: ICD-10-CM

## 2024-02-09 DIAGNOSIS — I25.10: ICD-10-CM

## 2024-02-09 DIAGNOSIS — F17.211: ICD-10-CM

## 2024-02-09 DIAGNOSIS — R91.1: ICD-10-CM

## 2024-02-09 DIAGNOSIS — Z12.2: Primary | ICD-10-CM

## 2024-02-09 DIAGNOSIS — I70.0: ICD-10-CM

## 2024-02-12 ENCOUNTER — HOSPITAL ENCOUNTER (OUTPATIENT)
Dept: HOSPITAL 53 - M PLALAB | Age: 74
End: 2024-02-12
Attending: UROLOGY
Payer: MEDICARE

## 2024-02-12 DIAGNOSIS — Z87.898: Primary | ICD-10-CM

## 2024-02-12 DIAGNOSIS — Z12.5: ICD-10-CM

## 2024-02-12 PROCEDURE — 36415 COLL VENOUS BLD VENIPUNCTURE: CPT

## 2024-06-04 ENCOUNTER — HOSPITAL ENCOUNTER (EMERGENCY)
Dept: HOSPITAL 53 - M ED | Age: 74
Discharge: LEFT BEFORE DISCHARGE | End: 2024-06-04
Payer: MEDICARE

## 2024-06-04 VITALS — BODY MASS INDEX: 27.71 KG/M2 | WEIGHT: 172.4 LBS | HEIGHT: 66 IN

## 2024-06-04 VITALS — SYSTOLIC BLOOD PRESSURE: 139 MMHG | OXYGEN SATURATION: 92 % | DIASTOLIC BLOOD PRESSURE: 65 MMHG

## 2024-06-04 VITALS — TEMPERATURE: 98.2 F

## 2024-06-04 DIAGNOSIS — Z53.21: Primary | ICD-10-CM

## 2024-12-03 ENCOUNTER — HOSPITAL ENCOUNTER (EMERGENCY)
Dept: HOSPITAL 53 - M ED | Age: 74
Discharge: HOME | End: 2024-12-03
Payer: MEDICARE

## 2024-12-03 VITALS — OXYGEN SATURATION: 93 % | TEMPERATURE: 97.2 F | SYSTOLIC BLOOD PRESSURE: 129 MMHG | DIASTOLIC BLOOD PRESSURE: 76 MMHG

## 2024-12-03 VITALS — HEIGHT: 66 IN | WEIGHT: 176.81 LBS | BODY MASS INDEX: 28.42 KG/M2

## 2024-12-03 DIAGNOSIS — Z79.01: ICD-10-CM

## 2024-12-03 DIAGNOSIS — Z79.82: ICD-10-CM

## 2024-12-03 DIAGNOSIS — I10: ICD-10-CM

## 2024-12-03 DIAGNOSIS — F17.200: ICD-10-CM

## 2024-12-03 DIAGNOSIS — M10.9: ICD-10-CM

## 2024-12-03 DIAGNOSIS — I48.91: Primary | ICD-10-CM

## 2024-12-03 DIAGNOSIS — Z79.899: ICD-10-CM

## 2024-12-03 DIAGNOSIS — N40.0: ICD-10-CM

## 2024-12-03 LAB
ALBUMIN SERPL BCG-MCNC: 3.8 G/DL (ref 3.2–5.2)
ALP SERPL-CCNC: 91 U/L (ref 40–129)
ALT SERPL W P-5'-P-CCNC: 50 U/L (ref 7–40)
APTT BLD: 28.6 SECONDS (ref 24.8–34.2)
AST SERPL-CCNC: 22 U/L (ref ?–34)
BASOPHILS # BLD AUTO: 0.1 10^3/UL (ref 0–0.2)
BASOPHILS NFR BLD AUTO: 0.5 % (ref 0–1)
BILIRUB CONJ SERPL-MCNC: 0.2 MG/DL (ref ?–0.4)
BILIRUB SERPL-MCNC: 0.5 MG/DL (ref 0.3–1.2)
BUN SERPL-MCNC: 18 MG/DL (ref 9–23)
CALCIUM SERPL-MCNC: 10 MG/DL (ref 8.3–10.6)
CHLORIDE SERPL-SCNC: 109 MMOL/L (ref 98–107)
CK MB CFR.DF SERPL CALC: 1.1
CK MB CFR.DF SERPL CALC: 1.34
CK MB SERPL-MCNC: 1.5 NG/ML (ref ?–3.6)
CK MB SERPL-MCNC: 2 NG/ML (ref ?–3.6)
CK SERPL-CCNC: 136 U/L (ref 46–171)
CK SERPL-CCNC: 149 U/L (ref 46–171)
CO2 SERPL-SCNC: 26 MMOL/L (ref 20–31)
CREAT SERPL-MCNC: 1 MG/DL (ref 0.7–1.3)
EOSINOPHIL # BLD AUTO: 0.2 10^3/UL (ref 0–0.5)
EOSINOPHIL NFR BLD AUTO: 2.5 % (ref 0–3)
GFR SERPL CREATININE-BSD FRML MDRD: > 60 ML/MIN/{1.73_M2} (ref 42–?)
GLUCOSE SERPL-MCNC: 104 MG/DL (ref 74–106)
HCT VFR BLD AUTO: 46.1 % (ref 42–52)
HGB BLD-MCNC: 15 G/DL (ref 13.5–17.5)
INR PPP: 1.12
LYMPHOCYTES # BLD AUTO: 2.1 10^3/UL (ref 1.5–5)
LYMPHOCYTES NFR BLD AUTO: 22.6 % (ref 24–44)
MCH RBC QN AUTO: 30.1 PG (ref 27–33)
MCHC RBC AUTO-ENTMCNC: 32.5 G/DL (ref 32–36.5)
MCV RBC AUTO: 92.4 FL (ref 80–96)
MONOCYTES # BLD AUTO: 1.6 10^3/UL (ref 0–0.8)
MONOCYTES NFR BLD AUTO: 16.7 % (ref 2–8)
NEUTROPHILS # BLD AUTO: 5.4 10^3/UL (ref 1.5–8.5)
NEUTROPHILS NFR BLD AUTO: 57.4 % (ref 36–66)
PLATELET # BLD AUTO: 268 10^3/UL (ref 150–450)
POTASSIUM SERPL-SCNC: 5.2 MMOL/L (ref 3.5–5.1)
PROT SERPL-MCNC: 7 G/DL (ref 5.7–8.2)
PROTHROMBIN TIME: 14.7 SECONDS (ref 12.5–14.5)
RBC # BLD AUTO: 4.99 10^6/UL (ref 4.3–6.1)
SODIUM SERPL-SCNC: 139 MMOL/L (ref 136–145)
TSH SERPL DL<=0.005 MIU/L-ACNC: 2.46 UIU/ML (ref 0.55–4.78)
WBC # BLD AUTO: 9.4 10^3/UL (ref 4–10)

## 2025-01-15 ENCOUNTER — HOSPITAL ENCOUNTER (OUTPATIENT)
Dept: HOSPITAL 53 - M OPP | Age: 75
Discharge: HOME | End: 2025-01-15
Attending: INTERNAL MEDICINE
Payer: MEDICARE

## 2025-01-15 VITALS — BODY MASS INDEX: 27.41 KG/M2 | HEIGHT: 66.5 IN | WEIGHT: 172.6 LBS

## 2025-01-15 VITALS — DIASTOLIC BLOOD PRESSURE: 68 MMHG | TEMPERATURE: 98.2 F | OXYGEN SATURATION: 96 % | SYSTOLIC BLOOD PRESSURE: 138 MMHG

## 2025-01-15 DIAGNOSIS — K64.0: ICD-10-CM

## 2025-01-15 DIAGNOSIS — D12.2: Primary | ICD-10-CM

## 2025-01-15 DIAGNOSIS — I48.91: ICD-10-CM

## 2025-01-15 DIAGNOSIS — K57.30: ICD-10-CM

## 2025-01-15 DIAGNOSIS — Z86.0100: ICD-10-CM

## 2025-01-15 DIAGNOSIS — Z79.899: ICD-10-CM

## 2025-01-15 DIAGNOSIS — Z79.01: ICD-10-CM

## 2025-02-18 ENCOUNTER — HOSPITAL ENCOUNTER (OUTPATIENT)
Dept: HOSPITAL 53 - M PLALAB | Age: 75
End: 2025-02-18
Attending: UROLOGY
Payer: MEDICARE

## 2025-02-18 DIAGNOSIS — Z12.5: Primary | ICD-10-CM

## 2025-02-18 PROCEDURE — 36415 COLL VENOUS BLD VENIPUNCTURE: CPT
